# Patient Record
Sex: MALE | Race: WHITE | Employment: OTHER | URBAN - METROPOLITAN AREA
[De-identification: names, ages, dates, MRNs, and addresses within clinical notes are randomized per-mention and may not be internally consistent; named-entity substitution may affect disease eponyms.]

---

## 2017-01-11 ENCOUNTER — TELEPHONE (OUTPATIENT)
Dept: FAMILY MEDICINE CLINIC | Facility: CLINIC | Age: 47
End: 2017-01-11

## 2017-05-12 ENCOUNTER — TELEPHONE (OUTPATIENT)
Dept: FAMILY MEDICINE CLINIC | Facility: CLINIC | Age: 47
End: 2017-05-12

## 2018-02-21 ENCOUNTER — TELEPHONE (OUTPATIENT)
Dept: FAMILY MEDICINE CLINIC | Facility: CLINIC | Age: 48
End: 2018-02-21

## 2018-05-15 ENCOUNTER — TELEPHONE (OUTPATIENT)
Dept: FAMILY MEDICINE CLINIC | Facility: CLINIC | Age: 48
End: 2018-05-15

## 2018-05-15 NOTE — TELEPHONE ENCOUNTER
In 2016 we got :  Received medical records request 07/12/16 from Department of [facility] requesting patient's records. Records were mailed to Department of [facility] 07/15/16.       [facility] but we still need to do a MA sup

## 2018-06-18 ENCOUNTER — TELEPHONE (OUTPATIENT)
Dept: FAMILY MEDICINE CLINIC | Facility: CLINIC | Age: 48
End: 2018-06-18

## 2018-06-18 DIAGNOSIS — Z00.00 LABORATORY EXAMINATION ORDERED AS PART OF A ROUTINE GENERAL MEDICAL EXAMINATION: Primary | ICD-10-CM

## 2018-06-18 NOTE — TELEPHONE ENCOUNTER
Diagnoses and all orders for this visit:    Laboratory examination ordered as part of a routine general medical examination  -     TSH W REFLEX TO FREE T4; Future  -     LIPID PANEL; Future  -     COMP METABOLIC PANEL (14);  Future    screening dx, so I bel

## 2018-06-18 NOTE — TELEPHONE ENCOUNTER
Pt has scheduled his MA Supervisit on 8/15/18. Did you want to order labs?  Pt not wanting them due to cost?

## 2018-08-09 ENCOUNTER — TELEPHONE (OUTPATIENT)
Dept: FAMILY MEDICINE CLINIC | Facility: CLINIC | Age: 48
End: 2018-08-09

## 2018-08-09 NOTE — TELEPHONE ENCOUNTER
Pt has answered his Medicare Assessment form for appt w/Dr Rochelle Toro on 8/15/18 (form in Triage)

## 2018-08-14 PROBLEM — M47.816 LUMBAR FACET ARTHROPATHY: Status: ACTIVE | Noted: 2018-08-14

## 2018-08-14 NOTE — ASSESSMENT & PLAN NOTE
He smoked tobacco in the past but quit greater than 12 months ago.   Smoking status: Former Smoker                                                              Packs/day: 0.50      Years: 30.00        Types: Cigarettes, Cigars     Quit date: 1/1/2000  Smoke

## 2018-08-14 NOTE — ASSESSMENT & PLAN NOTE
Continue to follow, working on [address], but resistance exists. Continue present management.

## 2018-08-15 ENCOUNTER — OFFICE VISIT (OUTPATIENT)
Dept: FAMILY MEDICINE CLINIC | Facility: CLINIC | Age: 48
End: 2018-08-15
Payer: MEDICARE

## 2018-08-15 VITALS
SYSTOLIC BLOOD PRESSURE: 124 MMHG | HEIGHT: 71 IN | HEART RATE: 100 BPM | BODY MASS INDEX: 41.58 KG/M2 | TEMPERATURE: 98 F | RESPIRATION RATE: 16 BRPM | WEIGHT: 297 LBS | DIASTOLIC BLOOD PRESSURE: 80 MMHG

## 2018-08-15 DIAGNOSIS — G89.29 CHRONIC PAIN OF BOTH SHOULDERS: ICD-10-CM

## 2018-08-15 DIAGNOSIS — D18.02 CAVERNOUS HEMANGIOMA OF BRAIN (HCC): ICD-10-CM

## 2018-08-15 DIAGNOSIS — Z00.00 ANNUAL PHYSICAL EXAM: Primary | ICD-10-CM

## 2018-08-15 DIAGNOSIS — M51.37 DDD (DEGENERATIVE DISC DISEASE), LUMBOSACRAL: ICD-10-CM

## 2018-08-15 DIAGNOSIS — E66.01 SEVERE OBESITY (BMI >= 40) (HCC): ICD-10-CM

## 2018-08-15 DIAGNOSIS — G89.29 CHRONIC PAIN OF BOTH KNEES: ICD-10-CM

## 2018-08-15 DIAGNOSIS — M25.561 CHRONIC PAIN OF BOTH KNEES: ICD-10-CM

## 2018-08-15 DIAGNOSIS — IMO0001: ICD-10-CM

## 2018-08-15 DIAGNOSIS — G89.4 CHRONIC PAIN SYNDROME: ICD-10-CM

## 2018-08-15 DIAGNOSIS — M25.512 CHRONIC PAIN OF BOTH SHOULDERS: ICD-10-CM

## 2018-08-15 DIAGNOSIS — I10 ESSENTIAL HYPERTENSION: ICD-10-CM

## 2018-08-15 DIAGNOSIS — M25.511 CHRONIC PAIN OF BOTH SHOULDERS: ICD-10-CM

## 2018-08-15 DIAGNOSIS — F17.211 CIGARETTE NICOTINE DEPENDENCE IN REMISSION: ICD-10-CM

## 2018-08-15 DIAGNOSIS — E66.01 MORBID OBESITY WITH BMI OF 40.0-44.9, ADULT (HCC): ICD-10-CM

## 2018-08-15 DIAGNOSIS — M25.562 CHRONIC PAIN OF BOTH KNEES: ICD-10-CM

## 2018-08-15 DIAGNOSIS — Z00.00 ENCOUNTER FOR ANNUAL HEALTH EXAMINATION: ICD-10-CM

## 2018-08-15 DIAGNOSIS — M47.816 LUMBAR FACET ARTHROPATHY: ICD-10-CM

## 2018-08-15 DIAGNOSIS — G25.81 RLS (RESTLESS LEGS SYNDROME): ICD-10-CM

## 2018-08-15 PROCEDURE — 96160 PT-FOCUSED HLTH RISK ASSMT: CPT | Performed by: FAMILY MEDICINE

## 2018-08-15 PROCEDURE — G0438 PPPS, INITIAL VISIT: HCPCS | Performed by: FAMILY MEDICINE

## 2018-08-15 RX ORDER — AMLODIPINE BESYLATE 5 MG/1
5 TABLET ORAL DAILY
COMMUNITY

## 2018-08-15 RX ORDER — POLYETHYLENE GLYCOL 3350 17 G/17G
17 POWDER, FOR SOLUTION ORAL DAILY
COMMUNITY

## 2018-08-15 RX ORDER — LISINOPRIL 20 MG/1
20 TABLET ORAL DAILY
COMMUNITY

## 2018-08-15 RX ORDER — ALBUTEROL SULFATE 90 UG/1
2 AEROSOL, METERED RESPIRATORY (INHALATION) EVERY 6 HOURS PRN
COMMUNITY

## 2018-08-15 RX ORDER — HYDROCHLOROTHIAZIDE 25 MG/1
25 TABLET ORAL DAILY
COMMUNITY

## 2018-08-15 RX ORDER — ATORVASTATIN CALCIUM 80 MG/1
80 TABLET, FILM COATED ORAL NIGHTLY
COMMUNITY

## 2018-08-15 NOTE — ASSESSMENT & PLAN NOTE
Getting therapy at the [facility], workup in progress, likely due to the crutch use and they are working on it but we can look at physical therapy here in occupational therapy as well if he runs out of benefits for the calendar year at the [facility] system

## 2018-08-15 NOTE — ASSESSMENT & PLAN NOTE
Getting physical therapy at the [facility] but we can offered here if symptoms are not improved and he runs out of benefits for the calendar year

## 2018-08-15 NOTE — PROGRESS NOTES
HPI:   Kelley Bush is a 51year old male who presents for a MA (Medicare Advantage) [facility] (Once per calendar year). Doing OK, following with CECILIA Perales.   His last annual assessment has been over 1 year: Annual Physical due on 05/04 problems based on screening of functional status. Difficulty walking?: Yes   He has problems with Daily Activities based on screening of functional status.    Problems with daily activities? : Yes   He has problems with Memory based on screening of functi both shoulders     Essential hypertension    Wt Readings from Last 3 Encounters:  08/15/18 : 297 lb  05/04/16 : 224 lb  02/04/16 : 227 lb 3.2 oz     Last Cholesterol Labs:   No results found for: CHOLEST, HDL, LDL, TRIG       Last Chemistry Labs:   No resu smoking about 18 years ago. His smoking use included Cigarettes and Cigars. He has a 15.00 pack-year smoking history. He has never used smokeless tobacco. He reports that he does not drink alcohol or use drugs.      REVIEW OF SYSTEMS:   Review of Systems normal. No oropharyngeal exudate. Eyes: Conjunctivae and EOM are normal. Pupils are equal, round, and reactive to light. Neck: Trachea normal and normal range of motion. Neck supple. Normal carotid pulses present. No edema present.  No thyroid mass and Cardiovascular    Essential hypertension    Overview     Lisinopril 20, metoprolol 25, amlodipine 5         Current Assessment & Plan     Stable, Continue present management.     Blood Pressure and Cardiac Medications          lisinopril 20 MG Oral Tab    m back brace         Current Assessment & Plan     Continue to follow, working on [address], but resistance exists. Continue present management.           Chronic pain of both knees    Overview     [facility] managing, on NSAIDS and PT         Curr separate sheet to patient  PREVENTATIVE SERVICES  INDICATIONS AND SCHEDULE Internal Lab or Procedure External Lab or Procedure   Diabetes Screening      HbgA1C   Annually No results found for: A1C    No flowsheet data found.     Fasting Blood Sugar (FSB)Norma by Medicare Part B) No vaccine history found This may be covered with your prescription benefits, but Medicare does not cover unless Medically needed    Zoster   Not covered by Medicare Part B No vaccine history found This may be covered with your pharmacy

## 2018-08-15 NOTE — PATIENT INSTRUCTIONS
Kostas Kim's SCREENING SCHEDULE   Tests on this list are recommended by your physician but may not be covered, or covered at this frequency, by your insurer. Please check with your insurance carrier before scheduling to verify coverage.     Taj Arita Covered every 5 years No results found for this or any previous visit. No flowsheet data found. Fecal Occult Blood   Covered Annually No results found for: FOB, OCCULTSTOOL No flowsheet data found.      Barium Enema-   uncomfortable but covered  Covered this or any previous visit. This may be covered with your pharmacy  prescription benefits     Recommended Websites for Advanced Directives    [email or web address]. org/publications/Documents/personal_[email or web address]. pdf  An information packet, including necessary form from

## 2018-08-15 NOTE — ASSESSMENT & PLAN NOTE
Stable, Continue present management.     Blood Pressure and Cardiac Medications          lisinopril 20 MG Oral Tab    metoprolol Tartrate 25 MG Oral Tab    AmLODIPine Besylate 5 MG Oral Tab

## 2019-02-11 ENCOUNTER — TELEPHONE (OUTPATIENT)
Dept: FAMILY MEDICINE CLINIC | Facility: CLINIC | Age: 49
End: 2019-02-11

## 2019-02-15 ENCOUNTER — MA CHART PREP (OUTPATIENT)
Dept: FAMILY MEDICINE CLINIC | Facility: CLINIC | Age: 49
End: 2019-02-15

## 2019-02-15 ENCOUNTER — OFFICE VISIT (OUTPATIENT)
Dept: FAMILY MEDICINE CLINIC | Facility: CLINIC | Age: 49
End: 2019-02-15
Payer: MEDICARE

## 2019-02-15 VITALS
HEART RATE: 68 BPM | SYSTOLIC BLOOD PRESSURE: 112 MMHG | WEIGHT: 292 LBS | HEIGHT: 71.25 IN | DIASTOLIC BLOOD PRESSURE: 80 MMHG | RESPIRATION RATE: 14 BRPM | BODY MASS INDEX: 40.43 KG/M2 | TEMPERATURE: 99 F

## 2019-02-15 DIAGNOSIS — G89.29 CHRONIC PAIN OF BOTH SHOULDERS: ICD-10-CM

## 2019-02-15 DIAGNOSIS — Z00.00 ANNUAL PHYSICAL EXAM: Primary | ICD-10-CM

## 2019-02-15 DIAGNOSIS — G25.81 RLS (RESTLESS LEGS SYNDROME): ICD-10-CM

## 2019-02-15 DIAGNOSIS — M25.562 CHRONIC PAIN OF BOTH KNEES: ICD-10-CM

## 2019-02-15 DIAGNOSIS — M25.511 CHRONIC PAIN OF BOTH SHOULDERS: ICD-10-CM

## 2019-02-15 DIAGNOSIS — G89.29 CHRONIC PAIN OF BOTH KNEES: ICD-10-CM

## 2019-02-15 DIAGNOSIS — G89.4 CHRONIC PAIN SYNDROME: ICD-10-CM

## 2019-02-15 DIAGNOSIS — I10 ESSENTIAL HYPERTENSION: ICD-10-CM

## 2019-02-15 DIAGNOSIS — M51.37 DDD (DEGENERATIVE DISC DISEASE), LUMBOSACRAL: ICD-10-CM

## 2019-02-15 DIAGNOSIS — F11.20 OPIOID DEPENDENCE IN CONTROLLED ENVIRONMENT (HCC): ICD-10-CM

## 2019-02-15 DIAGNOSIS — Z00.00 ENCOUNTER FOR ANNUAL HEALTH EXAMINATION: ICD-10-CM

## 2019-02-15 DIAGNOSIS — D18.02 CAVERNOUS HEMANGIOMA OF BRAIN (HCC): ICD-10-CM

## 2019-02-15 DIAGNOSIS — M47.816 LUMBAR FACET ARTHROPATHY: ICD-10-CM

## 2019-02-15 DIAGNOSIS — M25.561 CHRONIC PAIN OF BOTH KNEES: ICD-10-CM

## 2019-02-15 DIAGNOSIS — IMO0001: ICD-10-CM

## 2019-02-15 DIAGNOSIS — M25.512 CHRONIC PAIN OF BOTH SHOULDERS: ICD-10-CM

## 2019-02-15 DIAGNOSIS — E66.01 MORBID OBESITY WITH BMI OF 40.0-44.9, ADULT (HCC): ICD-10-CM

## 2019-02-15 DIAGNOSIS — F17.211 CIGARETTE NICOTINE DEPENDENCE IN REMISSION: ICD-10-CM

## 2019-02-15 DIAGNOSIS — R79.89 ELEVATED LFTS: ICD-10-CM

## 2019-02-15 PROCEDURE — 99396 PREV VISIT EST AGE 40-64: CPT | Performed by: FAMILY MEDICINE

## 2019-02-15 PROCEDURE — G0439 PPPS, SUBSEQ VISIT: HCPCS | Performed by: FAMILY MEDICINE

## 2019-02-15 PROCEDURE — 96160 PT-FOCUSED HLTH RISK ASSMT: CPT | Performed by: FAMILY MEDICINE

## 2019-02-15 RX ORDER — CLINDAMYCIN PHOSPHATE 10 MG/ML
1 LOTION TOPICAL 2 TIMES DAILY
Qty: 60 ML | Refills: 2 | Status: SHIPPED | OUTPATIENT
Start: 2019-02-15

## 2019-02-15 NOTE — PATIENT INSTRUCTIONS
Rizwana Kim's SCREENING SCHEDULE   Tests on this list are recommended by your physician but may not be covered, or covered at this frequency, by your insurer. Please check with your insurance carrier before scheduling to verify coverage.     June Neri Covered every 5 years No results found for this or any previous visit. No flowsheet data found. Fecal Occult Blood   Covered Annually No results found for: FOB, OCCULTSTOOL No flowsheet data found.      Barium Enema-   uncomfortable but covered  Covered this or any previous visit. This may be covered with your pharmacy  prescription benefits     Recommended Websites for Advanced Directives    [email or web address]. org/publications/Documents/personal_dec. pdf  An information packet, including necessary form from

## 2019-02-15 NOTE — PROGRESS NOTES
HPI:   Miguel Angel Manzanares is a 51year old male who presents for a MA (Medicare Advantage) [facility] (Once per calendar year).     Doing well, TBI in 2001, stable function  Annual Physical due on 08/15/2019    [facility] just did [facility], listed as \"Clear\" or stab with patient and Family/surrogate (if present), and forms available to patient in AVS       He does NOT have a Power of  for [employer] on file in [employer].    Advance care planning including the explanation and discussion of advance directives standar surgical history (2001) and hernia surgery (Right, 2017). His family history includes Hypertension in his father and mother; Stroke in his father. SOCIAL HISTORY:   He  reports that he quit smoking about 19 years ago.  His smoking use included cigarett are normal. He appears well-developed and well-nourished. HENT:   Head: Normocephalic and atraumatic.    Right Ear: Tympanic membrane, external ear and ear canal normal.   Left Ear: Tympanic membrane, external ear and ear canal normal.   Nose: Nose normal 10/30/2018   • Pneumovax 23 12/16/2015        ASSESSMENT AND OTHER RELEVANT CHRONIC CONDITIONS:   Nadege Nicholson is a 51year old male who presents for a Medicare Assessment.      PLAN SUMMARY:     Diet assessment: good     PLAN:  The patient indicates pain of both knees    Overview     [facility] managing, on NSAIDS and PT         Chronic pain of both shoulders    Overview     [facility] managing, chronic pain, may be due to crutch use.              Other    Chronic brain injury ([facility] [address]    Overview     Cavernous hemangiom patient  PREVENTATIVE SERVICES  INDICATIONS AND SCHEDULE Internal Lab or Procedure External Lab or Procedure   Diabetes Screening      HbgA1C   Annually No results found for: A1C    No flowsheet data found.     Fasting Blood Sugar (FSB)Annually No results f history found This may be covered with your prescription benefits, but Medicare does not cover unless Medically needed    Zoster   Not covered by Medicare Part B No vaccine history found This may be covered with your pharmacy  prescription benefits      SP

## 2019-03-13 ENCOUNTER — TELEPHONE (OUTPATIENT)
Dept: FAMILY MEDICINE CLINIC | Facility: CLINIC | Age: 49
End: 2019-03-13

## 2019-03-13 NOTE — TELEPHONE ENCOUNTER
Completed form and no changes since 2014, form in my triage been for sending the patient. There looks like there is a place for him to sign. OK to notify.  Thanks, Krystal Colunga MD

## 2019-03-13 NOTE — TELEPHONE ENCOUNTER
Pt needs a form filled out for the [employer] stating he okay to drive  Form in Dr. Moya Pair office for review  LOV 2-15-19

## 2019-06-28 ENCOUNTER — TELEPHONE (OUTPATIENT)
Dept: FAMILY MEDICINE CLINIC | Facility: CLINIC | Age: 49
End: 2019-06-28

## 2019-06-28 NOTE — TELEPHONE ENCOUNTER
Pt walked into office asking for a med for pink eye. Pt c/o itchy eyes, no redness, no swelling , no discharge. Explained to Pt that we dont treat without an appt or without symptoms. Pt then states he has been with discharge while here.  While looking at

## 2019-06-28 NOTE — TELEPHONE ENCOUNTER
Patient is here today and he said he believes he has pink eye. He wants to know if Dr. Bhavana Yanes can put in a script for him. His father is sitting in the car.

## 2020-02-26 ENCOUNTER — TELEPHONE (OUTPATIENT)
Dept: FAMILY MEDICINE CLINIC | Facility: CLINIC | Age: 50
End: 2020-02-26

## 2020-07-13 ENCOUNTER — TELEPHONE (OUTPATIENT)
Dept: FAMILY MEDICINE CLINIC | Facility: CLINIC | Age: 50
End: 2020-07-13

## 2020-07-13 DIAGNOSIS — Z12.5 SCREENING FOR PROSTATE CANCER: ICD-10-CM

## 2020-07-13 DIAGNOSIS — M47.816 LUMBAR FACET ARTHROPATHY: ICD-10-CM

## 2020-07-13 DIAGNOSIS — E66.01 MORBID OBESITY WITH BMI OF 40.0-44.9, ADULT (HCC): ICD-10-CM

## 2020-07-13 DIAGNOSIS — Z00.00 LABORATORY EXAMINATION ORDERED AS PART OF A ROUTINE GENERAL MEDICAL EXAMINATION: ICD-10-CM

## 2020-07-13 DIAGNOSIS — I10 ESSENTIAL HYPERTENSION: Primary | ICD-10-CM

## 2020-07-13 NOTE — TELEPHONE ENCOUNTER
Please enter lab orders for the patient's upcoming physical appointment. Physical scheduled:    Your appointments     Date & Time Appointment Department [facility])    Jul 24, 2020  2:00 PM CDT MA Supervisit with Ceci Hewitt MD [address] [place] [address] Jaycob Duarte

## 2020-07-20 ENCOUNTER — TELEPHONE (OUTPATIENT)
Dept: FAMILY MEDICINE CLINIC | Facility: CLINIC | Age: 50
End: 2020-07-20

## 2020-07-24 ENCOUNTER — OFFICE VISIT (OUTPATIENT)
Dept: FAMILY MEDICINE CLINIC | Facility: CLINIC | Age: 50
End: 2020-07-24
Payer: MEDICARE

## 2020-07-24 VITALS
TEMPERATURE: 98 F | HEART RATE: 88 BPM | BODY MASS INDEX: 43.54 KG/M2 | WEIGHT: 311 LBS | SYSTOLIC BLOOD PRESSURE: 134 MMHG | HEIGHT: 71 IN | DIASTOLIC BLOOD PRESSURE: 84 MMHG | RESPIRATION RATE: 20 BRPM

## 2020-07-24 DIAGNOSIS — G89.29 CHRONIC PAIN OF BOTH SHOULDERS: ICD-10-CM

## 2020-07-24 DIAGNOSIS — G25.81 RLS (RESTLESS LEGS SYNDROME): ICD-10-CM

## 2020-07-24 DIAGNOSIS — M47.816 LUMBAR FACET ARTHROPATHY: ICD-10-CM

## 2020-07-24 DIAGNOSIS — Z00.00 ENCOUNTER FOR ANNUAL HEALTH EXAMINATION: ICD-10-CM

## 2020-07-24 DIAGNOSIS — I10 ESSENTIAL HYPERTENSION: ICD-10-CM

## 2020-07-24 DIAGNOSIS — Z00.00 ANNUAL PHYSICAL EXAM: Primary | ICD-10-CM

## 2020-07-24 DIAGNOSIS — S06.9X9S CHRONIC BRAIN INJURY (HCC): ICD-10-CM

## 2020-07-24 DIAGNOSIS — M25.562 CHRONIC PAIN OF BOTH KNEES: ICD-10-CM

## 2020-07-24 DIAGNOSIS — M25.561 CHRONIC PAIN OF BOTH KNEES: ICD-10-CM

## 2020-07-24 DIAGNOSIS — F17.211 CIGARETTE NICOTINE DEPENDENCE IN REMISSION: ICD-10-CM

## 2020-07-24 DIAGNOSIS — G89.4 CHRONIC PAIN SYNDROME: ICD-10-CM

## 2020-07-24 DIAGNOSIS — M25.511 CHRONIC PAIN OF BOTH SHOULDERS: ICD-10-CM

## 2020-07-24 DIAGNOSIS — E66.01 MORBID OBESITY WITH BMI OF 40.0-44.9, ADULT (HCC): ICD-10-CM

## 2020-07-24 DIAGNOSIS — D18.02 CAVERNOUS HEMANGIOMA OF BRAIN (HCC): ICD-10-CM

## 2020-07-24 DIAGNOSIS — G89.29 CHRONIC PAIN OF BOTH KNEES: ICD-10-CM

## 2020-07-24 DIAGNOSIS — F11.20 OPIOID DEPENDENCE IN CONTROLLED ENVIRONMENT (HCC): ICD-10-CM

## 2020-07-24 DIAGNOSIS — R79.89 ELEVATED LFTS: ICD-10-CM

## 2020-07-24 DIAGNOSIS — M51.37 DDD (DEGENERATIVE DISC DISEASE), LUMBOSACRAL: ICD-10-CM

## 2020-07-24 DIAGNOSIS — M25.512 CHRONIC PAIN OF BOTH SHOULDERS: ICD-10-CM

## 2020-07-24 PROCEDURE — 99396 PREV VISIT EST AGE 40-64: CPT | Performed by: FAMILY MEDICINE

## 2020-07-24 PROCEDURE — 96160 PT-FOCUSED HLTH RISK ASSMT: CPT | Performed by: FAMILY MEDICINE

## 2020-07-24 PROCEDURE — G0439 PPPS, SUBSEQ VISIT: HCPCS | Performed by: FAMILY MEDICINE

## 2020-07-24 PROCEDURE — 3075F SYST BP GE 130 - 139MM HG: CPT | Performed by: FAMILY MEDICINE

## 2020-07-24 PROCEDURE — 3079F DIAST BP 80-89 MM HG: CPT | Performed by: FAMILY MEDICINE

## 2020-07-24 PROCEDURE — 3008F BODY MASS INDEX DOCD: CPT | Performed by: FAMILY MEDICINE

## 2020-07-24 NOTE — PROGRESS NOTES
HPI:   Grant Kelsey is a 45year old male who presents for a MA (Medicare Advantage) [address] [facility] (Once per calendar year). Recent blood in stool, but lots of BM movementes, stopped after 1 day. His last annual assessment has been over 1 year:  [place], and patient is instructed to get our office a copy of it for scanning into Epic. He does have a POA but we do NOT have it on file in [address] [place] [address] Rd.    The patient has this document but we do not have it in [facility], and patient is instructed to get our off mouth nightly. Albuterol Sulfate  (90 Base) MCG/ACT Inhalation Aero Soln, Inhale 2 puffs into the lungs every 6 (six) hours as needed for Wheezing. lisinopril 20 MG Oral Tab, Take 20 mg by mouth daily.   hydrochlorothiazide 25 MG Oral Tab, Take 25 Allergic/Immunologic: Negative for immunocompromised state. Neurological: Negative for weakness and numbness. Hematological: Negative for adenopathy.         EXAM:   /84 (BP Location: Left arm)   Pulse 88   Temp 98.3 °F (36.8 °C) (Temporal)   Re Effort normal and breath sounds normal. No respiratory distress. He has no decreased breath sounds. He has no wheezes. He has no rales. He exhibits no tenderness. Abdominal: Soft. Bowel sounds are normal. He exhibits no distension.  There is no hepatosple RLS (restless legs syndrome)    Overview     Ropinirole (requip) 1mg         Current Assessment & Plan     Stable continue present management             Mental Health    Cigarette nicotine dependence in remission    Overview     Cigarette 1/2 PPD until la follow up if no change         Morbid obesity with BMI of 40.0-44.9, adult (HCC)    Overview     Body mass index is 41.42 kg/m². Current Assessment & Plan     Working on weight loss.  Continue present management            Other Visit Diagnoses AA>50, > 65 No flowsheet data found.     Prostate Cancer Screening      PSA  Annually PSA due on 04/25/2019  Update Health Maintenance if applicable     Immunizations (Update Immunization Activity if applicable)     Influenza  Covered Annually No va

## 2020-07-24 NOTE — PATIENT INSTRUCTIONS
Jimbo Kim's SCREENING SCHEDULE   Tests on this list are recommended by your physician but may not be covered, or covered at this frequency, by your insurer. Please check with your insurance carrier before scheduling to verify coverage.     Efrain Wilkinson for this or any previous visit. No flowsheet data found. Fecal Occult Blood   Covered Annually No results found for: FOB, OCCULTSTOOL No flowsheet data found.      Barium Enema-   uncomfortable but covered  Covered but uncomfortable   Glaucoma Screening benefits     Recommended Websites for Advanced Directives    [email or web address].no. org/publications/Documents/personal_dec. pdf  An information packet, including necessary form from the [address] [employer] website. http://www. [place].state. [place]/publi

## 2021-01-18 ENCOUNTER — TELEPHONE (OUTPATIENT)
Dept: FAMILY MEDICINE CLINIC | Facility: CLINIC | Age: 51
End: 2021-01-18

## 2021-01-22 PROBLEM — M47.816 ARTHRITIS OF FACET JOINT OF LUMBAR SPINE: Status: ACTIVE | Noted: 2018-08-14

## 2021-01-25 ENCOUNTER — OFFICE VISIT (OUTPATIENT)
Dept: FAMILY MEDICINE CLINIC | Facility: CLINIC | Age: 51
End: 2021-01-25
Payer: MEDICARE

## 2021-01-25 VITALS
HEIGHT: 71 IN | DIASTOLIC BLOOD PRESSURE: 82 MMHG | BODY MASS INDEX: 43.56 KG/M2 | HEART RATE: 82 BPM | RESPIRATION RATE: 18 BRPM | WEIGHT: 311.19 LBS | TEMPERATURE: 97 F | SYSTOLIC BLOOD PRESSURE: 138 MMHG

## 2021-01-25 DIAGNOSIS — R79.89 ELEVATED LFTS: ICD-10-CM

## 2021-01-25 DIAGNOSIS — M25.511 CHRONIC PAIN OF BOTH SHOULDERS: ICD-10-CM

## 2021-01-25 DIAGNOSIS — S06.9X9S CHRONIC BRAIN INJURY (HCC): ICD-10-CM

## 2021-01-25 DIAGNOSIS — M47.816 ARTHRITIS OF FACET JOINT OF LUMBAR SPINE: ICD-10-CM

## 2021-01-25 DIAGNOSIS — M25.512 CHRONIC PAIN OF BOTH SHOULDERS: ICD-10-CM

## 2021-01-25 DIAGNOSIS — M25.561 CHRONIC PAIN OF BOTH KNEES: ICD-10-CM

## 2021-01-25 DIAGNOSIS — D18.02 CAVERNOUS HEMANGIOMA OF BRAIN (HCC): ICD-10-CM

## 2021-01-25 DIAGNOSIS — F17.211 CIGARETTE NICOTINE DEPENDENCE IN REMISSION: ICD-10-CM

## 2021-01-25 DIAGNOSIS — M51.37 DDD (DEGENERATIVE DISC DISEASE), LUMBOSACRAL: ICD-10-CM

## 2021-01-25 DIAGNOSIS — F11.20 OPIOID DEPENDENCE IN CONTROLLED ENVIRONMENT (HCC): ICD-10-CM

## 2021-01-25 DIAGNOSIS — G89.29 CHRONIC PAIN OF BOTH SHOULDERS: ICD-10-CM

## 2021-01-25 DIAGNOSIS — E66.01 MORBID OBESITY WITH BMI OF 40.0-44.9, ADULT (HCC): ICD-10-CM

## 2021-01-25 DIAGNOSIS — Z00.00 ENCOUNTER FOR ANNUAL HEALTH EXAMINATION: ICD-10-CM

## 2021-01-25 DIAGNOSIS — M25.562 CHRONIC PAIN OF BOTH KNEES: ICD-10-CM

## 2021-01-25 DIAGNOSIS — G25.81 RLS (RESTLESS LEGS SYNDROME): ICD-10-CM

## 2021-01-25 DIAGNOSIS — G89.29 CHRONIC PAIN OF BOTH KNEES: ICD-10-CM

## 2021-01-25 DIAGNOSIS — Z00.00 ANNUAL PHYSICAL EXAM: Primary | ICD-10-CM

## 2021-01-25 DIAGNOSIS — G89.4 CHRONIC PAIN SYNDROME: ICD-10-CM

## 2021-01-25 DIAGNOSIS — I10 ESSENTIAL HYPERTENSION: ICD-10-CM

## 2021-01-25 PROCEDURE — 3075F SYST BP GE 130 - 139MM HG: CPT | Performed by: FAMILY MEDICINE

## 2021-01-25 PROCEDURE — 3079F DIAST BP 80-89 MM HG: CPT | Performed by: FAMILY MEDICINE

## 2021-01-25 PROCEDURE — G0439 PPPS, SUBSEQ VISIT: HCPCS | Performed by: FAMILY MEDICINE

## 2021-01-25 PROCEDURE — 3008F BODY MASS INDEX DOCD: CPT | Performed by: FAMILY MEDICINE

## 2021-01-25 PROCEDURE — 96160 PT-FOCUSED HLTH RISK ASSMT: CPT | Performed by: FAMILY MEDICINE

## 2021-01-25 PROCEDURE — 99396 PREV VISIT EST AGE 40-64: CPT | Performed by: FAMILY MEDICINE

## 2021-01-25 NOTE — PATIENT INSTRUCTIONS
America Kim's SCREENING SCHEDULE   Tests on this list are recommended by your physician but may not be covered, or covered at this frequency, by your insurer. Please check with your insurance carrier before scheduling to verify coverage.     Claude Burkett for this or any previous visit. No flowsheet data found. Fecal Occult Blood   Covered Annually No results found for: FOB, OCCULTSTOOL No flowsheet data found.      Barium Enema-   uncomfortable but covered  Covered but uncomfortable   Glaucoma Screening benefits     Recommended Websites for Advanced Directives    SeekAlumni.no. org/publications/Documents/personal_dec. pdf  An information packet, including necessary form from the [address] website. http://www. [place]. [place]/publi

## 2021-01-25 NOTE — PROGRESS NOTES
HPI:   Marylou Galeano is a 45year old male who presents for a MA (Medicare Advantage) [address] [facility] (Once per calendar year). Overall doing well but chronic disability through [facility] and through [place] due to chronic head injury.   He has been  has Walking problems based on screening of functional status. Difficulty walking?: Yes   He has problems with Memory based on screening of functional status.    Memory Problems?: Yes   using crutches bilaterally    Depression Screening (PHQ-2/PHQ-9): Over shoulders     Essential hypertension     Opioid dependence in controlled environment ([facility] [address]     Elevated LFTs    Wt Readings from Last 3 Encounters:  01/25/21 : (!) 311 lb 3.2 oz (141.2 kg)  07/24/20 : (!) 311 lb (141.1 kg)  02/15/19 : 292 lb (132.5 kg) history (2001) and hernia surgery (Right, 2017). His family history includes Hypertension in his father and mother; Stroke in his father. SOCIAL HISTORY:   He  reports that he quit smoking about 21 years ago.  His smoking use included cigarettes and ci Cardiovascular: Normal rate, regular rhythm, S1 normal, S2 normal, normal heart sounds and intact distal pulses. Exam reveals no gallop, no S3, no S4 and no friction rub. No murmur heard. Edema not present. Carotid bruit not present.   Pulmonary/Ches hypertension    Overview     Lisinopril 20, metoprolol 25, amlodipine 5         Current Assessment & Plan     Stable, Continue present management.     Blood Pressure and Cardiac Medications          lisinopril 20 MG Oral Tab    metoprolol Tartrate 25 MG Ora Dx 2001, difficulty learning since that point, walks with cane. On disability long term b.o this         Current Assessment & Plan     Stable per [facility].  Continue present management             Other    Chronic brain injury [facility])    Overview     Cavernous GLUCOSE, GLU       Cardiovascular Disease Screening     LDL Annually No results found for: LDL, LDLC     EKG - w/ Initial Preventative Physical Exam only, or if medically necessary Electrocardiogram date    Colorectal Cancer Screening      Colonoscopy Scre Monitoring of Persistent     Medications (ACE/ARB, digoxin diuretics, anticonvulsants.)    Potassium  Annually No results found for: K, POTASSIUM No flowsheet data found. Creatinine  Annually No results found for: CREATSERUM No flowsheet data found.

## 2021-08-04 ENCOUNTER — OFFICE VISIT (OUTPATIENT)
Dept: FAMILY MEDICINE CLINIC | Facility: CLINIC | Age: 51
End: 2021-08-04
Payer: MEDICARE

## 2021-08-04 VITALS
DIASTOLIC BLOOD PRESSURE: 80 MMHG | WEIGHT: 315 LBS | RESPIRATION RATE: 16 BRPM | BODY MASS INDEX: 44.1 KG/M2 | HEIGHT: 71 IN | HEART RATE: 88 BPM | SYSTOLIC BLOOD PRESSURE: 118 MMHG

## 2021-08-04 DIAGNOSIS — M25.562 CHRONIC PAIN OF BOTH KNEES: ICD-10-CM

## 2021-08-04 DIAGNOSIS — G89.29 CHRONIC PAIN OF BOTH KNEES: ICD-10-CM

## 2021-08-04 DIAGNOSIS — E66.01 MORBID OBESITY WITH BMI OF 40.0-44.9, ADULT (HCC): ICD-10-CM

## 2021-08-04 DIAGNOSIS — M75.51 ACUTE BURSITIS OF RIGHT SHOULDER: ICD-10-CM

## 2021-08-04 DIAGNOSIS — I10 ESSENTIAL HYPERTENSION: Primary | ICD-10-CM

## 2021-08-04 DIAGNOSIS — F33.0 MILD RECURRENT MAJOR DEPRESSION (HCC): Chronic | ICD-10-CM

## 2021-08-04 DIAGNOSIS — F11.20 OPIOID DEPENDENCE IN CONTROLLED ENVIRONMENT (HCC): ICD-10-CM

## 2021-08-04 DIAGNOSIS — M25.561 CHRONIC PAIN OF BOTH KNEES: ICD-10-CM

## 2021-08-04 PROCEDURE — 3074F SYST BP LT 130 MM HG: CPT | Performed by: FAMILY MEDICINE

## 2021-08-04 PROCEDURE — 3079F DIAST BP 80-89 MM HG: CPT | Performed by: FAMILY MEDICINE

## 2021-08-04 PROCEDURE — 3008F BODY MASS INDEX DOCD: CPT | Performed by: FAMILY MEDICINE

## 2021-08-04 PROCEDURE — 99214 OFFICE O/P EST MOD 30 MIN: CPT | Performed by: FAMILY MEDICINE

## 2021-08-04 RX ORDER — BUSPIRONE HYDROCHLORIDE 10 MG/1
TABLET ORAL
COMMUNITY
Start: 2021-07-20

## 2021-08-04 RX ORDER — KETOCONAZOLE 20 MG/G
CREAM TOPICAL
COMMUNITY
Start: 2021-07-20

## 2021-08-04 RX ORDER — TIZANIDINE 4 MG/1
TABLET ORAL
COMMUNITY
Start: 2021-07-21

## 2021-08-04 RX ORDER — SULFACETAMIDE SODIUM 100 MG/ML
LOTION TOPICAL
COMMUNITY
Start: 2021-07-20

## 2021-08-04 RX ORDER — BENZOYL PEROXIDE 50 MG/ML
LOTION TOPICAL
COMMUNITY
Start: 2021-05-10

## 2021-08-04 RX ORDER — SERTRALINE HYDROCHLORIDE 100 MG/1
TABLET, FILM COATED ORAL
COMMUNITY
Start: 2021-07-20

## 2021-08-04 RX ORDER — MAG HYDROX/ALUMINUM HYD/SIMETH 400-400-40
SUSPENSION, ORAL (FINAL DOSE FORM) ORAL
COMMUNITY
Start: 2021-07-20

## 2021-08-04 RX ORDER — KETOCONAZOLE 20 MG/ML
SHAMPOO TOPICAL
COMMUNITY
Start: 2021-07-20

## 2021-08-04 RX ORDER — OMEPRAZOLE 20 MG/1
CAPSULE, DELAYED RELEASE ORAL
COMMUNITY
Start: 2021-07-20

## 2021-08-04 NOTE — PROGRESS NOTES
HPI/Subjective:     Patient presents with:  Blood Pressure: 6 month follow up      [address] is a 45year old male who presents for recheck of his hypertension.  He has been taking medications as instructed, with no medication side ef on weight loss. Continue present management   5. Acute bursitis of right shoulder  6. Mild recurrent major depression (HCC)  Overview:  PHQ-9 score of 5 in 1/2021, no meds  Assessment & Plan:  Stable off meds. Significant pain.   Home exercise program for

## 2021-08-12 RX ORDER — FUROSEMIDE 40 MG/1
TABLET ORAL
COMMUNITY
Start: 2021-07-20

## 2021-08-12 RX ORDER — DIVALPROEX SODIUM 500 MG/1
TABLET, EXTENDED RELEASE ORAL
COMMUNITY
Start: 2021-07-20

## 2021-08-13 ENCOUNTER — OFFICE VISIT (OUTPATIENT)
Dept: FAMILY MEDICINE CLINIC | Facility: CLINIC | Age: 51
End: 2021-08-13
Payer: MEDICARE

## 2021-08-13 VITALS
HEART RATE: 86 BPM | DIASTOLIC BLOOD PRESSURE: 80 MMHG | BODY MASS INDEX: 44.1 KG/M2 | RESPIRATION RATE: 14 BRPM | SYSTOLIC BLOOD PRESSURE: 126 MMHG | WEIGHT: 315 LBS | HEIGHT: 71 IN

## 2021-08-13 DIAGNOSIS — M75.51 CHRONIC BURSITIS OF RIGHT SHOULDER: ICD-10-CM

## 2021-08-13 DIAGNOSIS — M75.80 ROTATOR CUFF TENDONITIS, UNSPECIFIED LATERALITY: Primary | ICD-10-CM

## 2021-08-13 DIAGNOSIS — G89.4 CHRONIC PAIN SYNDROME: ICD-10-CM

## 2021-08-13 PROCEDURE — 3079F DIAST BP 80-89 MM HG: CPT | Performed by: FAMILY MEDICINE

## 2021-08-13 PROCEDURE — 3074F SYST BP LT 130 MM HG: CPT | Performed by: FAMILY MEDICINE

## 2021-08-13 PROCEDURE — 3008F BODY MASS INDEX DOCD: CPT | Performed by: FAMILY MEDICINE

## 2021-08-13 PROCEDURE — 20610 DRAIN/INJ JOINT/BURSA W/O US: CPT | Performed by: FAMILY MEDICINE

## 2021-08-13 RX ORDER — TRIAMCINOLONE ACETONIDE 40 MG/ML
40 INJECTION, SUSPENSION INTRA-ARTICULAR; INTRAMUSCULAR ONCE
Status: COMPLETED | OUTPATIENT
Start: 2021-08-13 | End: 2021-08-13

## 2021-08-13 RX ADMIN — TRIAMCINOLONE ACETONIDE 40 MG: 40 INJECTION, SUSPENSION INTRA-ARTICULAR; INTRAMUSCULAR at 01:15:00

## 2021-08-13 NOTE — PROGRESS NOTES
Vieira Solders is a 45year old male coming in for had concerns including Shots (cortisone shot on right shoulder ). HPI/Subjective:   HPI continued shoulder pain, evaluation from last month for cortisone injection.   Is good good strength with pain with ra injection  Follow-up in the office PRN. Assessment & Plan:   1. Rotator cuff tendonitis, unspecified laterality (Primary)  -     Triamcinolone Acetonide  2. Chronic pain syndrome  Overview:  Due to DDD, on [place] [address], #120/month, [facility] managing  3.

## 2022-04-26 NOTE — ASSESSMENT & PLAN NOTE
Stable, on disability, Stable, Continue present management. Rituxan Counseling:  I discussed with the patient the risks of Rituxan infusions. Side effects can include infusion reactions, severe drug rashes including mucocutaneous reactions, reactivation of latent hepatitis and other infections and rarely progressive multifocal leukoencephalopathy.  All of the patient's questions and concerns were addressed.

## 2023-04-03 NOTE — ASSESSMENT & PLAN NOTE
Patient viewed results via [employer]. The following message has been sent to patient via [employer].     \"Mini,  I see that you viewed your results via [employer]. I have placed you on the recall list, we will reach out when it's time to repeat your procedures. If you have any GI related questions or concerns you can give us a call at [phone]. \"    Recall and HM have been updated.   Stable continue present management

## 2023-09-15 PROBLEM — F11.20 OPIOID DEPENDENCE WITH CURRENT USE (HCC): Status: ACTIVE | Noted: 2019-02-15

## 2023-09-18 ENCOUNTER — OFFICE VISIT (OUTPATIENT)
Dept: FAMILY MEDICINE CLINIC | Facility: CLINIC | Age: 53
End: 2023-09-18
Payer: MEDICARE

## 2023-09-18 VITALS
HEIGHT: 71 IN | WEIGHT: 315 LBS | HEART RATE: 82 BPM | BODY MASS INDEX: 44.1 KG/M2 | DIASTOLIC BLOOD PRESSURE: 80 MMHG | RESPIRATION RATE: 16 BRPM | SYSTOLIC BLOOD PRESSURE: 130 MMHG

## 2023-09-18 DIAGNOSIS — D18.02 CAVERNOUS HEMANGIOMA OF BRAIN (HCC): ICD-10-CM

## 2023-09-18 DIAGNOSIS — F17.211 CIGARETTE NICOTINE DEPENDENCE IN REMISSION: ICD-10-CM

## 2023-09-18 DIAGNOSIS — M25.512 CHRONIC PAIN OF BOTH SHOULDERS: ICD-10-CM

## 2023-09-18 DIAGNOSIS — Z00.00 ENCOUNTER FOR ANNUAL HEALTH EXAMINATION: ICD-10-CM

## 2023-09-18 DIAGNOSIS — G25.81 RLS (RESTLESS LEGS SYNDROME): ICD-10-CM

## 2023-09-18 DIAGNOSIS — G89.4 CHRONIC PAIN SYNDROME: ICD-10-CM

## 2023-09-18 DIAGNOSIS — G89.29 CHRONIC PAIN OF BOTH KNEES: ICD-10-CM

## 2023-09-18 DIAGNOSIS — M51.37 DDD (DEGENERATIVE DISC DISEASE), LUMBOSACRAL: ICD-10-CM

## 2023-09-18 DIAGNOSIS — M25.562 CHRONIC PAIN OF BOTH KNEES: ICD-10-CM

## 2023-09-18 DIAGNOSIS — I10 ESSENTIAL HYPERTENSION: ICD-10-CM

## 2023-09-18 DIAGNOSIS — M25.511 CHRONIC PAIN OF BOTH SHOULDERS: ICD-10-CM

## 2023-09-18 DIAGNOSIS — F11.20 OPIOID DEPENDENCE WITH CURRENT USE (HCC): ICD-10-CM

## 2023-09-18 DIAGNOSIS — F33.0 MILD RECURRENT MAJOR DEPRESSION (HCC): Chronic | ICD-10-CM

## 2023-09-18 DIAGNOSIS — S06.9XAS CHRONIC BRAIN INJURY (HCC): ICD-10-CM

## 2023-09-18 DIAGNOSIS — E66.01 MORBID OBESITY WITH BMI OF 40.0-44.9, ADULT (HCC): ICD-10-CM

## 2023-09-18 DIAGNOSIS — R79.89 ELEVATED LFTS: ICD-10-CM

## 2023-09-18 DIAGNOSIS — M46.96 UNSPECIFIED INFLAMMATORY SPONDYLOPATHY, LUMBAR REGION (HCC): ICD-10-CM

## 2023-09-18 DIAGNOSIS — M25.561 CHRONIC PAIN OF BOTH KNEES: ICD-10-CM

## 2023-09-18 DIAGNOSIS — G89.29 CHRONIC PAIN OF BOTH SHOULDERS: ICD-10-CM

## 2023-09-18 DIAGNOSIS — Z00.00 ANNUAL PHYSICAL EXAM: Primary | ICD-10-CM

## 2023-09-18 DIAGNOSIS — M47.816 ARTHRITIS OF FACET JOINT OF LUMBAR SPINE: ICD-10-CM

## 2023-09-18 RX ORDER — AMOXICILLIN AND CLAVULANATE POTASSIUM 875; 125 MG/1; MG/1
TABLET, FILM COATED ORAL
COMMUNITY
Start: 2023-08-21

## 2023-10-02 ENCOUNTER — TELEPHONE (OUTPATIENT)
Dept: FAMILY MEDICINE CLINIC | Facility: CLINIC | Age: 53
End: 2023-10-02

## 2023-10-02 DIAGNOSIS — E66.01 MORBID OBESITY WITH BMI OF 40.0-44.9, ADULT (HCC): ICD-10-CM

## 2023-10-02 NOTE — TELEPHONE ENCOUNTER
Patient is calling and he is calling in his weight for 3 consecutive weeks as Dr. Walter Meraz requested    9/16/23 346  9/25/23 338  10/2/23 332    He has one shot left and wants to know what to do from here. Please call to advise.

## 2023-10-03 NOTE — TELEPHONE ENCOUNTER
I believe that he is on 0.5 mg. Please verify. Please ask about possible side effects. He will likely need to go up on dosage. KMDFRH dosing:  SUBQ: Initiate and adjust dose using the following schedule: In patients who do not tolerate a dosage increase, consider delaying the increase for an additional 4 weeks:    Week 1 through week 4 : 0.25 mg once weekly. Week 5 through week 8: 0.5 mg once weekly. Week 9 through week 12: 1 mg once weekly. Week 13 through week 16: 1.7 mg once weekly. Week 17 and thereafter (maintenance dosage): 2.4 mg once weekly (preferred regimen); if not tolerated, may use an alternative maintenance dosage of 1.7 mg once weekly.

## 2023-10-04 NOTE — TELEPHONE ENCOUNTER
Called and talked to patient went over dosing schedule he has not started this yet wants script sent to [address] [facility] to see if [facility] will cover this.

## 2023-10-12 ENCOUNTER — TELEPHONE (OUTPATIENT)
Dept: FAMILY MEDICINE CLINIC | Facility: CLINIC | Age: 53
End: 2023-10-12

## 2023-10-12 NOTE — TELEPHONE ENCOUNTER
Received medical records request from [address] [place] [address] requesting patient's medical records from 09/01/2022 to present. All records located in [address] [place] [address] in which request was sent to [employer].  [facility]. [id number] ext [id number]  Case ID # [id number]

## 2023-10-14 ENCOUNTER — TELEPHONE (OUTPATIENT)
Dept: FAMILY MEDICINE CLINIC | Facility: CLINIC | Age: 53
End: 2023-10-14

## 2023-10-14 DIAGNOSIS — E66.01 MORBID OBESITY WITH BMI OF 40.0-44.9, ADULT (HCC): ICD-10-CM

## 2024-02-02 ENCOUNTER — TELEPHONE (OUTPATIENT)
Dept: FAMILY MEDICINE CLINIC | Facility: CLINIC | Age: 54
End: 2024-02-02

## 2024-02-02 DIAGNOSIS — E66.01 MORBID OBESITY WITH BMI OF 40.0-44.9, ADULT (HCC): Primary | ICD-10-CM

## 2024-02-02 NOTE — TELEPHONE ENCOUNTER
Please notify:    Requested Prescriptions     Signed Prescriptions Disp Refills    semaglutide-weight management 0.25 MG/0.5ML Subcutaneous Solution Auto-injector 6 mL 3     Sig: Inject 0.5 mL (0.25 mg total) into the skin once a week.     Authorizing Provider: AMANDA SALEH        Sent to:      [facility] [address] [place], [place] - [address] [address] [phone], [phone] [address]  [facility] [address]  Phone: [phone] Fax: [phone]    [facility] - [place], [place] - [address] [place] [address] [phone], [phone]  [address] [place] [address]  Phone: [phone] Fax: [phone]    [facility]pharmacy #[id number] - [place], [place] - [address], [phone], [phone]  [address] [facility] [address]  Phone: [phone] Fax: [phone]

## 2024-05-17 ENCOUNTER — TELEPHONE (OUTPATIENT)
Dept: FAMILY MEDICINE CLINIC | Facility: CLINIC | Age: 54
End: 2024-05-17

## 2024-05-17 DIAGNOSIS — E66.01 MORBID OBESITY WITH BMI OF 40.0-44.9, ADULT (HCC): Primary | ICD-10-CM

## 2024-05-17 DIAGNOSIS — Z51.81 THERAPEUTIC DRUG MONITORING: ICD-10-CM

## 2024-05-17 DIAGNOSIS — R79.89 ELEVATED LFTS: ICD-10-CM

## 2024-05-17 DIAGNOSIS — Z79.899 LONG-TERM USE OF HIGH-RISK MEDICATION: ICD-10-CM

## 2024-05-17 DIAGNOSIS — I10 ESSENTIAL HYPERTENSION: ICD-10-CM

## 2024-05-17 DIAGNOSIS — Z12.5 SCREENING FOR PROSTATE CANCER: ICD-10-CM

## 2024-05-17 NOTE — TELEPHONE ENCOUNTER
1. Morbid obesity with BMI of 40.0-44.9, adult (HCC) (Primary)  Overview:  Body mass index is 41.42 kg/m².   Orders:  -     TSH W Reflex To Free T4; Future; Expected date: 05/17/2024  -     Lipid Panel; Future; Expected date: 05/17/2024  -     CBC With Differential With Platelet; Future; Expected date: 05/17/2024  -     Comp Metabolic Panel (14); Future; Expected date: 05/17/2024  2. Elevated LFTs  Overview:  [facility]x at [facility], better with TLC  Orders:  -     Comp Metabolic Panel (14); Future; Expected date: 05/17/2024  3. Long-term use of high-risk medication  -     Valproic Acid, (Depakene); Future; Expected date: 05/17/2024  4. Therapeutic drug monitoring  -     Valproic Acid, (Depakene); Future; Expected date: 05/17/2024  5. Screening for prostate cancer  -     PSA Total, Screen; Future; Expected date: 05/17/2024  6. Essential hypertension  Overview:  Lisinopril 20, metoprolol 25, amlodipine 5  Orders:  -     TSH W Reflex To Free T4; Future; Expected date: 05/17/2024  -     Lipid Panel; Future; Expected date: 05/17/2024  -     CBC With Differential With Platelet; Future; Expected date: 05/17/2024  -     Comp Metabolic Panel (14); Future; Expected date: 05/17/2024       OK to notify. Thanks, Demario Andrew MD

## 2024-05-17 NOTE — TELEPHONE ENCOUNTER
Please enter lab orders for the patient's upcoming physical appointment.     Physical scheduled:   Your appointments       Date & Time Appointment Department ([place])    Jun 13, 2024 9:00 AM CDT MA Supervisit with Arnulfo Andrew MD [facility] ([facility])              [facility]  [address]  [facility] [address]  [phone]           Preferred lab: [facility] ([facility])     The patient has been notified to complete fasting labs prior to their physical appointment.

## 2024-06-03 ENCOUNTER — TELEPHONE (OUTPATIENT)
Dept: FAMILY MEDICINE CLINIC | Facility: CLINIC | Age: 54
End: 2024-06-03

## 2024-06-03 NOTE — TELEPHONE ENCOUNTER
Received PA from [employer]ala stating patient needs a PA for the Wegovy 0.25 mg/0.5 ML Pen    Paperwork in Pat,RN's in box for review.     Pt is aware because he is  he said he knows that there are special ways of filing he appreciates our assistance in keeping him informed.

## 2024-06-11 RX ORDER — OMEGA-3-ACID ETHYL ESTERS 1 G/1
CAPSULE, LIQUID FILLED ORAL
COMMUNITY
Start: 2024-05-30

## 2024-06-11 RX ORDER — NAPROXEN 500 MG/1
TABLET ORAL
COMMUNITY
Start: 2024-05-30

## 2024-06-13 ENCOUNTER — OFFICE VISIT (OUTPATIENT)
Dept: FAMILY MEDICINE CLINIC | Facility: CLINIC | Age: 54
End: 2024-06-13
Payer: MEDICARE

## 2024-06-13 VITALS
BODY MASS INDEX: 44.1 KG/M2 | HEART RATE: 84 BPM | SYSTOLIC BLOOD PRESSURE: 130 MMHG | HEIGHT: 71 IN | RESPIRATION RATE: 16 BRPM | DIASTOLIC BLOOD PRESSURE: 86 MMHG | WEIGHT: 315 LBS

## 2024-06-13 DIAGNOSIS — F11.20 OPIOID DEPENDENCE WITH CURRENT USE (HCC): ICD-10-CM

## 2024-06-13 DIAGNOSIS — S06.9XAS CHRONIC BRAIN INJURY (HCC): ICD-10-CM

## 2024-06-13 DIAGNOSIS — I10 ESSENTIAL HYPERTENSION: ICD-10-CM

## 2024-06-13 DIAGNOSIS — M47.816 ARTHRITIS OF FACET JOINT OF LUMBAR SPINE: ICD-10-CM

## 2024-06-13 DIAGNOSIS — R79.89 ELEVATED LFTS: ICD-10-CM

## 2024-06-13 DIAGNOSIS — M51.37 DDD (DEGENERATIVE DISC DISEASE), LUMBOSACRAL: ICD-10-CM

## 2024-06-13 DIAGNOSIS — Z00.00 ENCOUNTER FOR ANNUAL HEALTH EXAMINATION: ICD-10-CM

## 2024-06-13 DIAGNOSIS — G89.29 CHRONIC PAIN OF BOTH SHOULDERS: ICD-10-CM

## 2024-06-13 DIAGNOSIS — M25.561 CHRONIC PAIN OF BOTH KNEES: ICD-10-CM

## 2024-06-13 DIAGNOSIS — D18.02 CAVERNOUS HEMANGIOMA OF BRAIN (HCC): ICD-10-CM

## 2024-06-13 DIAGNOSIS — M25.511 CHRONIC PAIN OF BOTH SHOULDERS: ICD-10-CM

## 2024-06-13 DIAGNOSIS — Z00.00 ANNUAL PHYSICAL EXAM: Primary | ICD-10-CM

## 2024-06-13 DIAGNOSIS — G89.4 CHRONIC PAIN SYNDROME: ICD-10-CM

## 2024-06-13 DIAGNOSIS — G25.81 RLS (RESTLESS LEGS SYNDROME): ICD-10-CM

## 2024-06-13 DIAGNOSIS — M25.512 CHRONIC PAIN OF BOTH SHOULDERS: ICD-10-CM

## 2024-06-13 DIAGNOSIS — E66.01 MORBID OBESITY WITH BMI OF 40.0-44.9, ADULT (HCC): ICD-10-CM

## 2024-06-13 DIAGNOSIS — F33.0 MILD RECURRENT MAJOR DEPRESSION (HCC): Chronic | ICD-10-CM

## 2024-06-13 DIAGNOSIS — M46.96 UNSPECIFIED INFLAMMATORY SPONDYLOPATHY, LUMBAR REGION (HCC): ICD-10-CM

## 2024-06-13 DIAGNOSIS — G89.29 CHRONIC PAIN OF BOTH KNEES: ICD-10-CM

## 2024-06-13 DIAGNOSIS — F17.211 CIGARETTE NICOTINE DEPENDENCE IN REMISSION: ICD-10-CM

## 2024-06-13 DIAGNOSIS — M25.562 CHRONIC PAIN OF BOTH KNEES: ICD-10-CM

## 2024-06-13 NOTE — ASSESSMENT & PLAN NOTE
Clinical Course: stable   Good control  Antidepressant Meds: sertraline Tabs - 100 MG  Anxiolytic Meds: busPIRone Tabs - 10 MG

## 2024-06-13 NOTE — ASSESSMENT & PLAN NOTE
Stable, continue present management and continue to monitor for progression source of pain. Continue pain managment

## 2024-06-13 NOTE — ASSESSMENT & PLAN NOTE
Working on wght loss. Continue to monitor and continue present management Wegovy for 8 weeks really helped, but [facility] wants 6 month training course before they will consider. Will try wegovy again, possibely zepbound. Will try mail order instead of local for better deliver and supply. .tb

## 2024-06-13 NOTE — ASSESSMENT & PLAN NOTE
Stable, continue present management and continue to monitor for progression with neurology at [facility] follow up.

## 2024-06-13 NOTE — ASSESSMENT & PLAN NOTE
Franky del castillo meds. Working with [facility] for control. Continue to monitor and continue present management

## 2024-06-13 NOTE — ASSESSMENT & PLAN NOTE
No K+ on file.  No Cr or GFR on file.  BP Meds: amLODIPine Tabs - 5 MG; furosemide Tabs - 40 MG; hydroCHLOROthiazide Tabs - 25 MG; lisinopril Tabs - 20 MG; metoprolol tartrate Tabs - 25 MG

## 2024-06-13 NOTE — PROGRESS NOTES
Subjective:   Iggy Kim is a 54 year old male who presents for a MA (Medicare Advantage) Supervisit (Once per calendar year) and scheduled follow up of multiple significant but stable problems.   Subsequent awv and wegovy follow up. 375 lb to 328 lb on wegovy, now slowly back up to 384.   Because [facility] gained [facility] back, [facility].     History/Other:   Fall Risk Assessment:   He has been screened for Falls and is High Risk. Fall Prevention information provided to patient in After Visit Summary.    Do you feel unsteady when standing or walking?: Yes  Do you worry about falling?: Yes  Have you fallen in the past year?: No     Cognitive Assessment:   Abnormal  What day of the week is this?: Correct  What month is it?: Correct  What year is it?: Correct  Recall \"Ball\": Correct  Recall \"Flag\": Incorrect  Recall \"Tree\": Incorrect hx traumatic brain injury, stable functino    Functional Ability/Status:   Iggy Kim has some abnormal functions as listed below:  He has Hearing problems based on screening of functional status.      Depression Screening (PHQ-2/PHQ-9): PHQ-9 TOTAL SCORE: 4  , done 2024   Feeling down, depressed, or hopeless: 1    Trouble falling or staying asleep, or sleeping too much: 1     Feeling tired or having little energy: 1    Poor appetite or overeatin    If you checked off any problems, how difficult have these problems made it for you to do your work, take care of things at home, or get along with other people?: Somewhat difficult         Advanced Directives:   He does have a Living Will but we do NOT have it on file in Epic.    He does have a POA but we do NOT have it on file in Epic.    Discussed Advance Care Planning with patient (and family/surrogate if present). Standard forms made available to patient in After Visit Summary.      Patient Active Problem List   Diagnosis    Cavernous hemangioma of brain (HCC)    Cigarette nicotine dependence in remission     Chronic pain syndrome    RLS (restless legs syndrome)    Chronic brain injury ([facility])    DDD (degenerative disc disease), lumbosacral    Morbid obesity with BMI of 40.0-44.9, adult ([facility])    Arthritis of facet joint of lumbar spine    Chronic pain of both knees    Chronic pain of both shoulders    Essential hypertension    Opioid dependence with current use ([facility])    Elevated LFTs    Mild recurrent major depression ([facility])    Unspecified inflammatory spondylopathy, lumbar region ([facility])     Allergies:  He is allergic to caffeine and meloxicam.    Current Medications:  Outpatient Medications Marked as Taking for the 6/13/24 encounter (Office Visit) with Arnulfo Andrew MD   Medication Sig    semaglutide-weight management 0.25 MG/0.5ML Subcutaneous Solution Auto-injector Inject 0.5 mL (0.25 mg total) into the skin once a week.    omega-3-acid ethyl esters 1 g Oral Cap     naproxen 500 MG Oral Tab     amoxicillin clavulanate 875-125 MG Oral Tab     furosemide 40 MG Oral Tab     divalproex Sodium  MG Oral Tablet 24 Hr     ACNE MEDICATION 5 5 % External Lotion     hydrocortisone 2.5 % External Ointment     ketoconazole 2 % External Cream     Ketoconazole 2 % External Shampoo     omeprazole 20 MG Oral Capsule Delayed Release     sertraline 100 MG Oral Tab     Sulfacetamide Sodium, Acne, 10 % External Lotion     tiZANidine 4 MG Oral Tab     triamcinolone acetonide 0.1 % External Cream     Diclofenac Sodium 1 % External Gel     VITAMIN D3 125 MCG (5000 UT) Oral Cap     busPIRone 10 MG Oral Tab     Clindamycin Phosphate 1 % External Lotion Apply 1 Application topically 2 (two) times daily.    atorvastatin 80 MG Oral Tab Take 1 tablet (80 mg total) by mouth nightly.    Albuterol Sulfate  (90 Base) MCG/ACT Inhalation Aero Soln Inhale 2 puffs into the lungs every 6 (six) hours as needed for Wheezing.    lisinopril 20 MG Oral Tab Take 1 tablet (20 mg total) by mouth daily.    hydrochlorothiazide 25 MG Oral Tab Take 1  tablet (25 mg total) by mouth daily.    metoprolol Tartrate 25 MG Oral Tab Take 1 tablet (25 mg total) by mouth 2 (two) times daily.    AmLODIPine Besylate 5 MG Oral Tab Take 1 tablet (5 mg total) by mouth daily.    Polyethylene Glycol 3350 (MIRALAX) Oral Powd Pack Take 17 g by mouth daily.    Diclofenac Sodium 1 % Transdermal Gel Apply 1 g topically 2 (two) times daily.    Psyllium (METAMUCIL FIBER OR) Take 1 Applicatorful by mouth daily.    rOPINIRole HCl (REQUIP) 1 MG Oral Tab Take 1 tablet by mouth nightly. At Bedtime    Naproxen Sodium (ALEVE) 220 MG Oral Tab Take 1-2 tablets (220-440 mg total) by mouth Q12H.    Multiple Vitamin (MULTI VITAMIN MENS) Oral Tab Take 1 tablet by mouth daily.    omega-3 fatty acids (FISH OIL) 1000 MG Oral Cap Take 1,000 mg by mouth daily.    Misc Natural Products (OSTEO BI-FLEX ADV DOUBLE ST OR) Take  by mouth.       Medical History:  He  has no past medical history on file.  Surgical History:  He  has a past surgical history that includes other surgical history () and hernia surgery (Right, 2017).   Family History:  His family history includes Hypertension in his father and mother; Stroke in his father.  Social History:  He  reports that he quit smoking about 24 years ago. His smoking use included cigarettes and cigars. He started smoking about 54 years ago. He has a 15 pack-year smoking history. He has never used smokeless tobacco. He reports that he does not drink alcohol and does not use drugs.    Tobacco:  He smoked tobacco in the past but quit greater than 12 months ago.  Social History     Tobacco Use   Smoking Status Former    Current packs/day: 0.00    Average packs/day: 0.5 packs/day for 30.0 years (15.0 ttl pk-yrs)    Types: Cigarettes, Cigars    Start date: 1970    Quit date: 2000    Years since quittin.4   Smokeless Tobacco Never        CAGE Alcohol Screen:   CAGE screening score of 0 on 2024, showing low risk of alcohol abuse.      Patient Care  Team:  Arnulfo Andrew MD as PCP - General (Family Practice)    Review of Systems   Constitutional: Negative.  Negative for activity change, appetite change, chills and fever.   HENT: Negative.     Eyes: Negative.    Respiratory: Negative.  Negative for shortness of breath.    Cardiovascular: Negative.  Negative for chest pain and palpitations.   Gastrointestinal: Negative.  Negative for abdominal pain.   Genitourinary: Negative.  Negative for dysuria.   Musculoskeletal:  Negative for arthralgias.   Skin: Negative.  Negative for rash.   Allergic/Immunologic: Negative.    Neurological: Negative.          Objective:   Physical Exam  Vitals and nursing note reviewed.   Constitutional:       General: He is not in acute distress.     Appearance: Normal appearance. He is obese.   HENT:      Head: Normocephalic and atraumatic.      Right Ear: Tympanic membrane and external ear normal.      Left Ear: Tympanic membrane and external ear normal.      Nose: Nose normal.      Mouth/Throat:      Mouth: Mucous membranes are moist.   Eyes:      Extraocular Movements: Extraocular movements intact.      Pupils: Pupils are equal, round, and reactive to light.   Cardiovascular:      Rate and Rhythm: Normal rate and regular rhythm.      Pulses: Normal pulses.           Carotid pulses are 2+ on the right side and 2+ on the left side.       Radial pulses are 2+ on the right side and 2+ on the left side.        Dorsalis pedis pulses are 2+ on the right side and 2+ on the left side.        Posterior tibial pulses are 2+ on the right side and 2+ on the left side.      Heart sounds: Normal heart sounds, S1 normal and S2 normal. No murmur heard.  Pulmonary:      Effort: Pulmonary effort is normal.      Breath sounds: Normal breath sounds.   Abdominal:      General: Abdomen is flat. Bowel sounds are normal. There is no distension.      Palpations: Abdomen is soft.   Musculoskeletal:         General: Normal range of motion.      Cervical back:  Normal range of motion and neck supple.      Right lower leg: No edema.      Left lower leg: No edema.   Skin:     General: Skin is warm and dry.      Capillary Refill: Capillary refill takes less than 2 seconds.   Neurological:      General: No focal deficit present.      Mental Status: He is alert and oriented to person, place, and time.   Psychiatric:         Mood and Affect: Mood normal.         Behavior: Behavior normal.         Thought Content: Thought content normal.         /86   Pulse 84   Resp 16   Ht 5' 11\" (1.803 m)   Wt (!) 384 lb (174.2 kg)   BMI 53.56 kg/m²  Estimated body mass index is 53.56 kg/m² as calculated from the following:    Height as of this encounter: 5' 11\" (1.803 m).    Weight as of this encounter: 384 lb (174.2 kg).    Medicare Hearing Assessment:   Hearing Screening    Screening Method: Whisper Test  Whisper Test Result: Pass         Visual Acuity:   Right Eye Visual Acuity: Uncorrected Right Eye Chart Acuity: 20/20   Left Eye Visual Acuity: Uncorrected Left Eye Chart Acuity: 20/30   Both Eyes Visual Acuity: Uncorrected Both Eyes Chart Acuity: 20/20   Able To Tolerate Visual Acuity: Yes        Assessment & Plan:   Iggy Kim is a 54 year old male who presents for a Medicare Assessment.     1. Annual physical exam (Primary)  2. Cavernous hemangioma of brain (HCC)  Overview:  Dx 2001, difficulty learning since that point, walks with cane. On disability long term b.o this  Assessment & Plan:  Stable, continue present management and continue to monitor for progression   Orders:  -     Detailed, Mod Complex (77307)  3. Morbid obesity with BMI of 40.0-44.9, adult (HCC)  Overview:  Body mass index is 41.42 kg/m².   Assessment & Plan:  Working on wght loss. Continue to monitor and continue present management Wegovy for 8 weeks really helped, but [facility] wants [facility] training [facility] before they will consider. Will try wegovy again, possibely zepbound. Will try mail order instead  [facility] for better deliver and supply. .tb   Orders:  -     Semaglutide-Weight Management; Inject 0.5 mL (0.25 mg total) into the skin once a week.  Dispense: 6 mL; Refill: 3  -     Tania, Mod Complex ([id number])  4. Opioid dependence with current use (HCC)  Overview:  [facility]   Assessment & Plan:  Stable n meds. Working with [facility] for control. Continue to monitor and continue present management   Orders:  -     Tania, Mod Complex (86171)  5. Mild recurrent major depression (HCC)  Overview:  PHQ-9 score of 5 in 1/2021, no meds  Assessment & Plan:  Clinical Course: stable   Good control  Antidepressant Meds: sertraline Tabs - 100 MG  Anxiolytic Meds: busPIRone Tabs - 10 MG   Orders:  -     Tania, Mod Complex ([id number])  6. Unspecified inflammatory spondylopathy, lumbar region (HCC)  Overview:  [facility] by [facility] there  Assessment & Plan:  Stable, continue present management and continue to monitor for progression source of pain. Continue pain managment  7. Chronic brain injury (HCC)  Overview:  Cavernous hemangioma casuing disability and learning difficulty since Dx in 2001.  Assessment & Plan:  Stable, continue present management and continue to monitor for progression with [facility]y at [facility] follow up.  Orders:  -     Tania, Mod Complex (97321)  8. Essential hypertension  Overview:  Lisinopril 20, metoprolol 25, amlodipine 5  Assessment & Plan:  No K+ on file.  No Cr or GFR on file.  BP Meds: amLODIPine Tabs - 5 MG; furosemide Tabs - 40 MG; hydroCHLOROthiazide Tabs - 25 MG; lisinopril Tabs - 20 MG; metoprolol tartrate Tabs - 25 MG    Orders:  -     Detailed, Mod Complex ([id number])  9. RLS (restless legs syndrome)  Overview:  Ropinirole (requip) 1mg  Assessment & Plan:  Stable, continue present management and continue to monitor for progression   10. Chronic pain syndrome  Overview:  Due to DDD, on [place] 10, #120/month, [facility] managing  Assessment & Plan:  Stable,  continue present management and continue to monitor for progression per [facility]  Orders:  -     Detailed, Mod Complex ([id number])  11. Arthritis of facet joint of lumbar spine  Overview:  Seen 11/2015 L spine filme, chronic pain on meds as needed, chronic back brace  Assessment & Plan:  Stable, continue present management and continue to monitor for progression   Orders:  -     Detailed, Mod Complex ([id number])  12. DDD (degenerative disc disease), lumbosacral  Overview:  Norco 10 # 120/month and alleve, injections in college, no help, uses cane daily.   Assessment & Plan:  Stable, continue present management and continue to monitor for progression   13. Elevated LFTs  Overview:  [facility], better with [facility]  Assessment & Plan:  Stable, continue present management and continue to monitor for progression, following [facility] labs  Orders:  -     Detailed, Mod Complex ([id number])  14. Chronic pain of both shoulders  Overview:  [facility] managing, chronic pain, may be due to crutch use.   Assessment & Plan:  Stable, continue present management and continue to monitor for progression   15. Chronic pain of both knees  Overview:  [facility] managing, on NSAIDS and PT  Assessment & Plan:  Stable, continue present management and continue to monitor for progression   16. Cigarette nicotine dependence in remission  Overview:  Cigarette 1/2 PPD until 2017  Assessment & Plan:  Stable, continue present management and continue to monitor for progression   17. Encounter for annual health examination    The patient indicates understanding of these issues and agrees to the plan.  Reinforced healthy diet, lifestyle, and exercise.      Return in 6 months (on 12/13/2024).     Arnulfo Andrew MD, 6/13/2024     Supplementary Documentation:   General Health:  In the past six months, have you lost more than 10 pounds without trying?: 2 - No  Has your appetite been poor?: No  Type of Diet: Balanced  How does the patient maintain a good energy level?: Appropriate Exercise  How would  you describe your daily physical activity?: Light  How would you describe your current health state?: Fair  How do you maintain positive mental well-being?: Visiting Family  On a scale of 0 to 10, with 0 being no pain and 10 being severe pain, what is your pain level?: 5 - (Moderate)  In the past six months, have you experienced urine leakage?: 0-No  At any time do you feel concerned for the safety/well-being of yourself and/or your children, in your home or elsewhere?: No  Have you had any immunizations at another office such as Influenza, Hepatitis B, Tetanus, or Pneumococcal?: No        Iggy Kim's SCREENING SCHEDULE   Tests on this list are recommended by your physician but may not be covered, or covered at this frequency, by your insurer.   Please check with your insurance carrier before scheduling to verify coverage.   PREVENTATIVE SERVICES FREQUENCY &  COVERAGE DETAILS LAST COMPLETION DATE   Diabetes Screening    Fasting Blood Sugar / Glucose    One screening every 12 months if never tested or if previously tested but not diagnosed with pre-diabetes   One screening every 6 months if diagnosed with pre-diabetes No results found for: \"GLUCOSE\", \"GLU\"     Cardiovascular Disease Screening    Lipid Panel  Cholesterol  Lipoprotein (HDL)  Triglycerides Covered every 5 years for all Medicare beneficiaries without apparent signs or symptoms of cardiovascular disease No results found for: \"CHOLEST\", \"HDL\", \"LDL\", \"LDLD\", \"LDLC\", \"TRIG\"      Electrocardiogram (EKG)   Covered if needed at Welcome to Medicare, and non-screening if indicated for medical reasons -      Ultrasound Screening for Abdominal Aortic Aneurysm (AAA) Covered once in a lifetime for one of the following risk factors    Men who are 65-75 years old and have ever smoked    Anyone with a family history -     Colorectal Cancer Screening  Covered for ages 50-85; only need ONE of the following:    Colonoscopy   Covered every 10 years    Covered  every 2 years if patient is at high risk or previous colonoscopy was abnormal 01/01/2019    Health Maintenance   Topic Date Due    Colorectal Cancer Screening  01/01/2022       Flexible Sigmoidoscopy   Covered every 4 years -    Fecal Occult Blood Test Covered annually -   Prostate Cancer Screening    Prostate-Specific Antigen (PSA) Annually No results found for: \"PSA\"  Health Maintenance   Topic Date Due    PSA  Never done      Immunizations    Influenza Covered once per flu season  Please get every year 09/18/2023  No recommendations at this time    Pneumococcal Each vaccine (Oflrvvn66 & Jeuppaucj36) covered once after 65 Prevnar 13: -    [id number]: 12/16/2015     No recommendations at this time    Hepatitis B One screening covered for patients with certain risk factors   -  No recommendations at this time    Tetanus Toxoid Not covered by Medicare Part B unless medically necessary (cut with metal); may be covered with your pharmacy prescription benefits -    Tetanus, Diptheria and Pertusis TD and TDaP Not covered by Medicare Part B -  DTaP,Tdap,and Td Vaccines(1 - Tdap) Never done    Zoster Not covered by Medicare Part B; may be covered with your pharmacy  prescription benefits -  Zoster Vaccines(1 of 2) Never done     Annual Monitoring of Persistent Medications (ACE/ARB, digoxin diuretics, anticonvulsants)    Potassium Annually No results found for: \"K\", \"POTASSIUM\"      Creatinine   Annually No results found for: \"CREATININE\", \"CREATSERUM\"      BUN Annually No results found for: \"BUN\"    Drug Serum Conc Annually No results found for: \"DIGOXIN\", \"DIG\", \"VALP\"

## 2024-06-14 NOTE — PATIENT INSTRUCTIONS
Iggy Kim's SCREENING SCHEDULE   Tests on this list are recommended by your physician but may not be covered, or covered at this frequency, by your insurer.   Please check with your insurance carrier before scheduling to verify coverage.   PREVENTATIVE SERVICES FREQUENCY &  COVERAGE DETAILS LAST COMPLETION DATE   Diabetes Screening    Fasting Blood Sugar / Glucose    One screening every 12 months if never tested or if previously tested but not diagnosed with pre-diabetes   One screening every 6 months if diagnosed with pre-diabetes No results found for: \"GLUCOSE\", \"GLU\"     Cardiovascular Disease Screening    Lipid Panel  Cholesterol  Lipoprotein (HDL)  Triglycerides Covered every 5 years for all Medicare beneficiaries without apparent signs or symptoms of cardiovascular disease No results found for: \"CHOLEST\", \"HDL\", \"LDL\", \"LDLD\", \"LDLC\", \"TRIG\"      Electrocardiogram (EKG)   Covered if needed at Welcome to Medicare, and non-screening if indicated for medical reasons -      Ultrasound Screening for Abdominal Aortic Aneurysm (AAA) Covered once in a lifetime for one of the following risk factors   • Men who are 65-75 years old and have ever smoked   • Anyone with a family history -     Colorectal Cancer Screening  Covered for ages 50-85; only need ONE of the following:    Colonoscopy   Covered every 10 years    Covered every 2 years if patient is at high risk or previous colonoscopy was abnormal 01/01/2019    Health Maintenance   Topic Date Due   • Colorectal Cancer Screening  01/01/2022       Flexible Sigmoidoscopy   Covered every 4 years -    Fecal Occult Blood Test Covered annually -   Prostate Cancer Screening    Prostate-Specific Antigen (PSA) Annually No results found for: \"PSA\"  Health Maintenance   Topic Date Due   • PSA  Never done      Immunizations    Influenza Covered once per flu season  Please get every year 09/18/2023  No recommendations at this time    Pneumococcal Each vaccine (Iimsrbb92 &  Lbosrveor41) covered once after 65 Prevnar 13: -    [id number]: 12/16/2015     No recommendations at this time    Hepatitis B One screening covered for patients with certain risk factors   -  No recommendations at this time    Tetanus Toxoid Not covered by Medicare Part B unless medically necessary (cut with metal); may be covered with your pharmacy prescription benefits -    Tetanus, Diptheria and Pertusis TD and TDaP Not covered by Medicare Part B -  DTaP,Tdap,and Td Vaccines(1 - Tdap) Never done    Zoster Not covered by Medicare Part B; may be covered with your pharmacy  prescription benefits -  Zoster Vaccines(1 of 2) Never done     Annual Monitoring of Persistent Medications (ACE/ARB, digoxin diuretics, anticonvulsants)    Potassium Annually No results found for: \"K\", \"POTASSIUM\"      Creatinine   Annually No results found for: \"CREATININE\", \"CREATSERUM\"      BUN Annually No results found for: \"BUN\"    Drug Serum Conc Annually No results found for: \"DIGOXIN\", \"DIG\", \"VALP\"

## 2024-06-17 NOTE — TELEPHONE ENCOUNTER
Submitted all information through [employer] portal and received the following response    There was an error with your request  Eligibility could not be verified for this patient - patient not found. Please review patient information and re-submit.

## 2024-06-17 NOTE — TELEPHONE ENCOUNTER
Received fax request from [employer] for prior auth to be completed  for Wegovy pen  Web site referred to CoverMyMeds portal

## 2024-06-25 NOTE — TELEPHONE ENCOUNTER
Pts dad stopped by the office today because he doesn't understand why the PA was denied and would like to talk to someone about it    Paperwork placed in Pats box that pts dad brought in

## 2024-06-26 NOTE — TELEPHONE ENCOUNTER
Submitted through [employer] Portal and received the following electronic reply    Reference Number  [id number]    Status  PENDED    Review Reason 1  Requires Medical Review    Message  This case requires further review. You will be contacted if additional information is needed.    Also uploaded LOV christoph/ Kamran on 06/13/24

## 2024-07-02 NOTE — TELEPHONE ENCOUNTER
Accessed [facility] Medicare portal and researched Wegovy       Received the following information    Wegovy 0.25 mg/0.5 mL subcutaneous pen injector  0.5ML SYRINGE (sold in a package of 4)  1 per month  Every 1 Month    [facility] Not covered  Coverage details  Not covered    [facility] -[phone]  Formulary ID: [id number]

## 2024-07-03 NOTE — TELEPHONE ENCOUNTER
Spoke to patient he told him that [facility] was [facility]. He said he already knew and that the insurance told him too. He was very disappointed but said he can not afford $1000 out of pocket.

## 2025-02-05 PROCEDURE — 99283 EMERGENCY DEPT VISIT LOW MDM: CPT

## 2025-02-05 PROCEDURE — 99284 EMERGENCY DEPT VISIT MOD MDM: CPT

## 2025-02-05 PROCEDURE — 96372 THER/PROPH/DIAG INJ SC/IM: CPT

## 2025-02-06 ENCOUNTER — HOSPITAL ENCOUNTER (EMERGENCY)
Facility: HOSPITAL | Age: 55
Discharge: HOME | End: 2025-02-06
Attending: EMERGENCY MEDICINE
Payer: MEDICARE

## 2025-02-06 VITALS
TEMPERATURE: 99 F | SYSTOLIC BLOOD PRESSURE: 126 MMHG | OXYGEN SATURATION: 95 % | BODY MASS INDEX: 44.1 KG/M2 | WEIGHT: 315 LBS | HEIGHT: 71 IN | RESPIRATION RATE: 20 BRPM | DIASTOLIC BLOOD PRESSURE: 75 MMHG | HEART RATE: 68 BPM
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
## 2025-02-06 DIAGNOSIS — S39.012A BACK STRAIN, INITIAL ENCOUNTER: ICD-10-CM

## 2025-02-06 RX ORDER — HYDROCODONE BITARTRATE AND ACETAMINOPHEN 5; 325 MG/1; MG/1
1-2 TABLET ORAL EVERY 4 HOURS PRN
Qty: 20 TABLET | Refills: 0 | Status: SHIPPED | OUTPATIENT
Start: 2025-02-06 | End: 2025-02-13

## 2025-02-06 RX ORDER — MORPHINE SULFATE 4 MG/ML
4 INJECTION, SOLUTION INTRAMUSCULAR; INTRAVENOUS ONCE
Status: COMPLETED | OUTPATIENT
Start: 2025-02-06 | End: 2025-02-06

## 2025-02-06 RX ORDER — METHYLPREDNISOLONE 4 MG/1
TABLET ORAL
Qty: 21 EACH | Refills: 0 | Status: SHIPPED | OUTPATIENT
Start: 2025-02-06

## 2025-02-06 RX ORDER — CYCLOBENZAPRINE HCL 10 MG
10 TABLET ORAL 3 TIMES DAILY PRN
Qty: 20 TABLET | Refills: 0 | Status: SHIPPED | OUTPATIENT
Start: 2025-02-06 | End: 2025-02-13

## 2025-02-06 RX ORDER — DIAZEPAM 5 MG/1
5 TABLET ORAL ONCE
Status: COMPLETED | OUTPATIENT
Start: 2025-02-06 | End: 2025-02-06

## 2025-02-06 NOTE — ED INITIAL ASSESSMENT (HPI)
Pt slipped and fell on his back. Pt c/o lower back pain and \"heard it go crack\". Denies head injury, -LOC, - blood thinners.

## 2025-02-06 NOTE — ED PROVIDER NOTES
Patient Seen in: [facility] Emergency Department      History     Chief Complaint   Patient presents with    Fall     fall on ice backwards, no LOC but sharp pain and felt pop/\"crack\" no thinners        Stated Complaint: fall on ice backwards, no LOC but sharp pain and felt pop/\"crack\"    Subjective:   HPI      54-year-old male reports that he was taking out the garbage late at night when he slipped on the stairs. He describes stepping over a package, which caused him to take a wide step and subsequently lose his footing. Both feet went out from under him, and he landed directly on his back on the stairs. He heard two distinct cracks during the fall. Currently, he is experiencing significant pain in his lower back. He did not report any other symptoms or previous similar incidents.  Denies any bowel or bladder dysfunction no paresthesias or numbness    Objective:     Past Medical History:    Back pain              Past Surgical History:   Procedure Laterality Date    [facility]    Other surgical history  2001    craniotomy after TBI                Social History     Socioeconomic History    Marital status: Single   Occupational History    Occupation: disabled   Tobacco Use    Smoking status: Former     Current packs/day: 0.00     Average packs/day: 0.5 packs/day for 30.0 years (15.0 ttl pk-yrs)     Types: Cigarettes, Cigars     Start date: 1970     Quit date: 2000     Years since quittin.1    Smokeless tobacco: Never   Vaping Use    Vaping status: Never Used   Substance and Sexual Activity    Alcohol use: No     Alcohol/week: 0.0 standard drinks of alcohol    Drug use: No   Other Topics Concern     Service Yes    Blood Transfusions No    Caffeine Concern No    Exercise No     Comment: gardening     Seat Belt Yes                  Physical Exam     ED Triage Vitals [25 0021]   BP (!) 157/100   Pulse 88   Resp 20   Temp 99 °F (37.2 °C)   Temp src Temporal   SpO2  95 %   O2 Device None (Room air)       Current Vitals:   Vital Signs  BP: (!) 157/100  Pulse: 88  Resp: 20  Temp: 99 °F (37.2 °C)  Temp src: Temporal    Oxygen Therapy  SpO2: 95 %  O2 Device: None (Room air)        Physical Exam    Vital signs reviewed  General appearance: Patient is alert and in moderate to severe pain distress HEENT: Pupils equal react to light extraocular muscles intact no scleral icterus, mucous membranes are moist, there is no erythema or exudate in the posterior pharynx  Neck: Supple no JVD no lymphadenopathy no meningismus no carotid bruit  CV: Regular rate and rhythm no murmur rub  Respiratory: Clear to auscultation bilaterally no crackles no wheezes no accessory muscle use  Abdomen: Soft nontender nondistended, no rebound no guarding  no hepatosplenomegaly bowel sounds are present , no pulsatile mass  Extremities: No clubbing cyanosis or edema 2+ distal pulses.  Neuro: Cranial nerves II through XII intact with no gross focal sensory or motor abnormality.  Back: Patient has paraspinal and spinal tenderness over L4-L5 no deformity palpated    ED Course   Labs Reviewed - No data to display         Patient was evaluated and had an x-ray of the lumbar spine which showed no acute compression fracture or any malalignment.  Does have some degenerative changes.  Patient be given a shot of morphine IM and Valium we will send him home with Norco Flexeril and Medrol Dosepak and he can follow-up with spine.  Told him to ice the affected area.  Return if worse     XR LUMBAR SPINE   Comparison: None available for review.    IMPRESSION:  No definite evidence for acute displaced fracture seen by this modality, however CT is suggested for further evaluation if there is clinical concern for acute osseous injury.  MDM      Differential diagnosis reflecting the complexity of care include: Compression fracture, herniated disc, back sprain    Comorbidities that add complexity to management include: Chronic  pain      My independent interpretation of studies of: X-ray shows no acute fracture no displacement.    Diagnostic tests and medications considered but not ordered were: MRI of lumbar spine was considered but feel this could be done as an outpatient    Social determinants of health that affect care: Patient does take narcotics frequently for chronic pain    Shared decision making was done by myself patient and his friend.  They will get a ride home.  He was given 1 shot of morphine and some Valium and will be sent home with pain medication.  Ice and stretch and follow-up with spine          Medical Decision Making      Disposition and Plan     Clinical Impression:  1. Fall on ice    2. Back strain, initial encounter         Disposition:  Discharge  2/6/2025  4:08 am    Follow-up:  Cristian Pereyra MD  [address], [place] [address]  [phone]    Follow up            Medications Prescribed:  Current Discharge Medication List        START taking these medications    Details   cyclobenzaprine 10 MG Oral Tab Take 1 tablet (10 mg total) by mouth 3 (three) times daily as needed for Muscle spasms.  Qty: 20 tablet, Refills: 0    Associated Diagnoses: Fall on ice      HYDROcodone-acetaminophen 5-325 MG Oral Tab Take 1-2 tablets by mouth every 4 (four) hours as needed for Pain.  Qty: 20 tablet, Refills: 0    Associated Diagnoses: Fall on ice      methylPREDNISolone 4 MG Oral Tablet Therapy Pack Dosepack: use as directed on packaging  Qty: 21 each, Refills: 0    Associated Diagnoses: Fall on ice                 Supplementary Documentation: Patient was screened and evaluated during this visit.  As the treating physician attending to the patient, I determined within reasonable clinical confidence and prior to discharge, that an emergency medical condition was not or was no longer present.  There was no indication for further evaluation, treatment, or admission on an emergency basis.  Comprehensive  verbal and written discharge and follow-up instructions were provided to help prevent relapse or worsening.  Patient was instructed to follow-up with primary care provider for further evaluation treatment, return immediately to ER for worsening, concerning, new, or changing/persisting symptoms.  I discussed the case with the patient and they had no questions, complaints, or concerns.  Patient was comfortable going home.      Dictation Disclaimer Note:   To increase efficiency this document may have been prepared using voice recognition technology. Every effort has been made to correct any errors made during preparation of this note. However, if a word or phrase is confusing, or does not make sense, this is likely due to a recognition error within the program which was not discovered during editing. Please do not hesitate to contact to address any significant errors.    Note to Patient:   The 21st Century Cures Act makes medical notes like these available to patients in the interest of transparency. Please be advised this is a medical document. Medical documents are intended to carry relevant information, facts as evident, and the clinical opinion of the practitioner. The medical note is intended as peer to peer communication and may appear blunt or direct. It is written in medical language and may contain abbreviations or verbiage that are unfamiliar.

## 2025-02-07 ENCOUNTER — PATIENT OUTREACH (OUTPATIENT)
Dept: CASE MANAGEMENT | Age: 55
End: 2025-02-07

## 2025-02-07 ENCOUNTER — TELEPHONE (OUTPATIENT)
Dept: FAMILY MEDICINE CLINIC | Facility: CLINIC | Age: 55
End: 2025-02-07

## 2025-02-07 NOTE — TELEPHONE ENCOUNTER
Spoke to patient for Transitions of Care call today.    The patient is scheduled for an ER follow up with Dr. Andrew on 02/17/2025. Patient declined sooner appointment stating he is in a lot of pain from the fall he had.   The patient is aware he needs to follow up with a Neurosurgeon. Patient [facility] that [facility] and patient agreed to call his Neurosurgeon today to inform him of the fall he had and to schedule a follow up appointment.       ER Follow-up appointment needed by 02/13/2025.  Please advise.    BOOK BY DATE: 02/13/2025    Clinical staff:  FYI- patient declined sooner appointment for ER follow up. Patient will call to schedule with [facility]. Thank you!       Future Appointments   Date Time Provider Department Center   2/17/2025  4:15 PM Arnulfo Andrew MD [facility] [place]   6/13/2025  2:45 PM Arnulfo Andrew MD [facility] [place]        33-year-old female with past medical history of anemia and gastric sleeve done on May 5 presents to ED for 2 days of nausea vomiting and weakness.  Patient states when she stands up she gets lightheaded.  No diarrhea no constipation no abdominal pain no fever no chills.  No CP, SOB. Patient initially did well after gastric sleeve with liquids and had a difficult time with solids so she went back to [facility].  Patient's been following up with her surgeon.  past surgery: tubal ligation     Const: NAD  Eyes: PERRL, no conjunctival injection  HENT:  Neck supple without meningismus   CV: RRR, Warm, well-perfused extremities  RESP: CTA B/L, no tachypnea   GI: soft, non-tender, non-distended  MSK: No gross deformities appreciated  Skin: Warm, dry. No rashes  Neuro: Alert, CNs II-XII grossly intact. Sensation and motor function of extremities grossly intact.  Psych: Appropriate mood and affect.    will do labs, give fluids and nausea medications

## 2025-02-07 NOTE — PROGRESS NOTES
Transitions of Care Navigation  Discharge Date: 25  Contact Date: 2025    Transitions of Care Assessment:  JAVY Initial Assessment    General:  Assessment completed with: Patient  Patient Subjective: Spoke with patient who reports he is having extreme pain today mostly in the lower back. He rates his pain a 8/10 at rest and a 10/10 with activity. Having difficulty moving. He is taking Norco as needed. Is applying heat. He was not able to  his new medications but states that they were overnight shipped to him and he is supposed to receive them today. (See Nursing interventions)  Chief Complaint: Primary Diagnosis Fall on ice  Back strain, initial encounter  Verify patient name and  with patient/ caregiver: Yes    Hospital Stay/Discharge:  Tell me what you understand of why you were in the hospital or emergency department: Fall  Prior to leaving the hospital were your Discharge Instructions reviewed with you?: Yes  Did you receive a copy of your written Discharge Instructions?: Yes  What questions do you have about your Discharge Instructions?: Patient denies  Do you feel better or worse since you left the hospital or emergency department?: Same    Follow - Up Appointment:  Do you have a follow-up appointment?: No  Are there any barriers to getting to your follow-up appointment?: No    Home Health/DME:  Prior to leaving the hospital was Home Health (HH) arranged for you?: N/A  Are HH needs identified by staff during the assessment?: No     Prior to leaving the hospital or emergency department was Durable Medical Equipment (DME), medical supplies, or infusions arranged for you?: N/A  Are DME/medical supply/infusions needs identified by staff during this assessment?: No     Medications/Diet:  Did any of your medications change, during or after your hospital stay or ED visit?: Yes  Do you have your new or updated medications?: No  Do you understand what your medications are for and possible side  effects?: Yes  Are there any reasons that keep you from taking your medication as prescribed?: No  Any concerns about medication refills?: No    Were you given a different diet per your Discharge Instructions?: No     Questions/Concerns:  Do you have any questions or concerns that have not been discussed?: No       Nursing Interventions:    The patient states [facility] who he follows up regularly for his back pain. He states that he just had an appointment last week for his back and had xrays done. NCM advised patient to call [facility] to inform them of his fall from yesterday and to schedule a follow up visit. He verbalized agreement with this.   NCM discussed with patient alternating between heat and ice therapy throughout the day. Provided education on the newly prescribed medications. The patient states he is [facility].   ER follow up appointment offered however patient states he is in so much pain unable to come into the office. The soonest appointment patient agreed to was 02/17/2025.  All d/c instructions reviewed with pt.  Reviewed when to call MD vs when to go to ER/call 911.  Educated pt on the importance of taking all meds as prescribed as well as close f/u with PCP/specialists.  Pt verbalized understanding and will contact office with any further questions or concerns.       Medications:  Medication Reconciliation:  I am aware of an inpatient discharge within the last 30 days.  The discharge medication list has been reconciled with the patient's current medication list and reviewed by me. See medication list for additions of new medication, and changes to current doses of medications and discontinued medications.  Current Outpatient Medications   Medication Sig Dispense Refill    cyclobenzaprine 10 MG Oral Tab Take 1 tablet (10 mg total) by mouth 3 (three) times daily as needed for Muscle spasms. 20 tablet 0     HYDROcodone-acetaminophen 5-325 MG Oral Tab Take 1-2 tablets by mouth every 4 (four) hours as needed for Pain. 20 tablet 0    methylPREDNISolone 4 MG Oral Tablet Therapy Pack Dosepack: use as directed on packaging 21 each 0    semaglutide-weight management 0.25 MG/0.5ML Subcutaneous Solution Auto-injector Inject 0.5 mL (0.25 mg total) into the skin once a week. 6 mL 3    omega-3-acid ethyl esters 1 g Oral Cap       naproxen 500 MG Oral Tab       amoxicillin clavulanate 875-125 MG Oral Tab       furosemide 40 MG Oral Tab       divalproex Sodium  MG Oral Tablet 24 Hr       ACNE MEDICATION 5 5 % External Lotion       hydrocortisone 2.5 % External Ointment       ketoconazole 2 % External Cream       Ketoconazole 2 % External Shampoo       omeprazole 20 MG Oral Capsule Delayed Release       sertraline 100 MG Oral Tab       Sulfacetamide Sodium, Acne, 10 % External Lotion       tiZANidine 4 MG Oral Tab       triamcinolone acetonide 0.1 % External Cream       Diclofenac Sodium 1 % External Gel       VITAMIN D3 125 MCG (5000 UT) Oral Cap       busPIRone 10 MG Oral Tab       Clindamycin Phosphate 1 % External Lotion Apply 1 Application topically 2 (two) times daily. 60 mL 2    atorvastatin 80 MG Oral Tab Take 1 tablet (80 mg total) by mouth nightly.      Albuterol Sulfate  (90 Base) MCG/ACT Inhalation Aero Soln Inhale 2 puffs into the lungs every 6 (six) hours as needed for Wheezing.      lisinopril 20 MG Oral Tab Take 1 tablet (20 mg total) by mouth daily.      hydrochlorothiazide 25 MG Oral Tab Take 1 tablet (25 mg total) by mouth daily.      metoprolol Tartrate 25 MG Oral Tab Take 1 tablet (25 mg total) by mouth 2 (two) times daily.      AmLODIPine Besylate 5 MG Oral Tab Take 1 tablet (5 mg total) by mouth daily.      Polyethylene Glycol 3350 (MIRALAX) Oral Powd Pack Take 17 g by mouth daily.      Diclofenac Sodium 1 % Transdermal Gel Apply 1 g topically 2 (two) times daily.      Psyllium (METAMUCIL FIBER OR)  Take 1 Applicatorful by mouth daily.      rOPINIRole HCl (REQUIP) 1 MG Oral Tab Take 1 tablet by mouth nightly. At Bedtime 90 tablet 3    Naproxen Sodium (ALEVE) 220 MG Oral Tab Take 1-2 tablets (220-440 mg total) by mouth Q12H.      Multiple Vitamin (MULTI VITAMIN MENS) Oral Tab Take 1 tablet by mouth daily.      omega-3 fatty acids (FISH OIL) 1000 MG Oral Cap Take 1,000 mg by mouth daily.      Misc Natural Products (OSTEO BI-FLEX ADV DOUBLE ST OR) Take  by mouth.         Follow-up Appointments:  Your appointments       Date & Time Appointment Department ([place])    Jun 13, 2025 2:45 PM CDT MA Supervisit with Arnulfo Andrew MD [facility] ([facility])              [facility]  [address]  [facility] [address]  [phone]            Transitional [facility] Clinic  Was TCC Ordered: No      Primary Care Provider (If no TCC appointment)  Does patient already have a PCP appointment scheduled? No  Nurse Care Manager Scheduled PCP office ER Follow-up appointment with patient     Specialist  Does the patient have any other follow-up appointment(s) need to be scheduled? Yes   -If yes: Nurse Care Manager reviewed upcoming specialist appointments with patient: Yes   -Does the patient need assistance scheduling appointment(s): No      Book By Date: 02/13/2025

## 2025-02-16 NOTE — ASSESSMENT & PLAN NOTE
Cavernous hemangioma casuing disability and learning difficulty since Dx in 2001, stable source of disability. Continue to monitor and continue present management

## 2025-02-16 NOTE — ASSESSMENT & PLAN NOTE
BP shows good control with last BP of 132/70. Continue lifestyle changes, diet, exercise and weight loss.   No K, Cr or eGFR on file.  BP Meds: amLODIPine Tabs - 5 MG; furosemide Tabs - 40 MG; hydroCHLOROthiazide Tabs - 25 MG; lisinopril Tabs - 20 MG; metoprolol tartrate Tabs - 25 MG   ACE/ARB Adherence Poor at 0% [facility] so fully compliant

## 2025-02-16 NOTE — ASSESSMENT & PLAN NOTE
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 Continue present management.   Antidepressant Meds: sertraline Tabs - 100 MG  Anxiolytic Meds: busPIRone Tabs - 10 MG  .

## 2025-02-16 NOTE — ASSESSMENT & PLAN NOTE
Diagnosis in 2001, source of disability and weakness. Stable, continue present management and continue to monitor for progression

## 2025-02-16 NOTE — ASSESSMENT & PLAN NOTE
Last BMI: 49.09, Class 3 Obesity.  He was counseled on diet and exercise for elevated BMI which is affecting his hypertension and osteoarthritis.

## 2025-02-17 ENCOUNTER — TELEPHONE (OUTPATIENT)
Dept: CASE MANAGEMENT | Facility: HOSPITAL | Age: 55
End: 2025-02-17

## 2025-02-17 ENCOUNTER — OFFICE VISIT (OUTPATIENT)
Dept: FAMILY MEDICINE CLINIC | Facility: CLINIC | Age: 55
End: 2025-02-17
Payer: MEDICARE

## 2025-02-17 VITALS
WEIGHT: 315 LBS | BODY MASS INDEX: 44.1 KG/M2 | SYSTOLIC BLOOD PRESSURE: 132 MMHG | RESPIRATION RATE: 16 BRPM | HEIGHT: 71 IN | DIASTOLIC BLOOD PRESSURE: 70 MMHG | HEART RATE: 80 BPM

## 2025-02-17 DIAGNOSIS — F11.20 OPIOID DEPENDENCE WITH CURRENT USE (HCC): ICD-10-CM

## 2025-02-17 DIAGNOSIS — S06.9XAS CHRONIC BRAIN INJURY: ICD-10-CM

## 2025-02-17 DIAGNOSIS — D18.02 CAVERNOUS HEMANGIOMA OF BRAIN (HCC): ICD-10-CM

## 2025-02-17 DIAGNOSIS — E66.01 MORBID OBESITY WITH BMI OF 40.0-44.9, ADULT (HCC): ICD-10-CM

## 2025-02-17 DIAGNOSIS — S39.012D BACK STRAIN, SUBSEQUENT ENCOUNTER: Primary | ICD-10-CM

## 2025-02-17 DIAGNOSIS — I10 ESSENTIAL HYPERTENSION: ICD-10-CM

## 2025-02-17 DIAGNOSIS — F33.0 MILD RECURRENT MAJOR DEPRESSION: Chronic | ICD-10-CM
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
## 2025-02-17 PROCEDURE — 99214 OFFICE O/P EST MOD 30 MIN: CPT | Performed by: FAMILY MEDICINE

## 2025-02-17 PROCEDURE — 3075F SYST BP GE 130 - 139MM HG: CPT | Performed by: FAMILY MEDICINE

## 2025-02-17 PROCEDURE — 3008F BODY MASS INDEX DOCD: CPT | Performed by: FAMILY MEDICINE

## 2025-02-17 PROCEDURE — 3078F DIAST BP <80 MM HG: CPT | Performed by: FAMILY MEDICINE

## 2025-02-17 PROCEDURE — G2211 COMPLEX E/M VISIT ADD ON: HCPCS | Performed by: FAMILY MEDICINE

## 2025-02-17 RX ORDER — OXYCODONE HYDROCHLORIDE 10 MG/1
10 TABLET ORAL EVERY 4 HOURS PRN
Qty: 30 TABLET | Refills: 0 | Status: SHIPPED | OUTPATIENT
Start: 2025-02-17

## 2025-02-17 RX ORDER — TIRZEPATIDE 7.5 MG/.5ML
INJECTION, SOLUTION SUBCUTANEOUS
COMMUNITY

## 2025-02-17 RX ORDER — PREDNISONE 10 MG/1
TABLET ORAL
Qty: 35 TABLET | Refills: 0 | Status: SHIPPED | OUTPATIENT
Start: 2025-02-17 | End: 2025-03-09

## 2025-02-17 NOTE — CM/SW NOTE
Patient states he got a letter saying he had an appointment with a \"back\" doctor today (he thinks)  Patient states he gets all of his care at [place].  Patient does not want to get charged for  \"no show\" to the appointment.  Per patient's chart, patient scheduled an appt with Dr. Andrew today through the Transitions Care team on 2/7/25.  Patient does not wish to follow up with Dr. Andrew.  Patient was provided with Dr. Andrew' office number and MONAE Summers from [facility] phone number.

## 2025-02-17 NOTE — PROGRESS NOTES
Subjective:   Iggy Kim is a 54 year old male who presents for a MA AHA (Medicare Advantage Annual Health Assessment) and Subsequent Annual Wellness visit (Pt already had Initial Annual Wellness) and scheduled follow up of multiple significant but stable problems.     Fell on ice 2/6, hurts a lot. Carrying father's adult diapers, did not see box on fron t porch, both legs went strainght up and landed on full on lower back, could not move initially because of pain. ED notes recired.     Films revieweding.   Baseline Norco 5 q6, increased to 10 mg q4 for this. Norco at higher level still not helping, not leaving bed for last 10 days other than today.         Patient Active Problem List   Diagnosis    Cavernous hemangioma of brain (HCC)    Cigarette nicotine dependence in remission    Chronic pain syndrome    RLS (restless legs syndrome)    Chronic brain injury    DDD (degenerative disc disease), lumbosacral    Morbid obesity with BMI of 40.0-44.9, adult ([facility])    Arthritis of facet joint of lumbar spine    Chronic pain of both knees    Chronic pain of both shoulders    Essential hypertension    Opioid dependence with current use ([facility])    Elevated LFTs    Mild recurrent major depression    Unspecified inflammatory spondylopathy, lumbar region     Allergies:  He is allergic to caffeine and meloxicam.    Current Medications:      Medical History:  He  has a past medical history of Back pain.  Surgical History:  He  has a past surgical history that includes other surgical history (2001) and hernia surgery (Right, 2017).   Family History:  His family history includes Hypertension in his father and mother; Stroke in his father.  Social History:  He  reports that he quit smoking about 25 years ago. His smoking use included cigarettes and cigars. He started smoking about 55 years ago. He has a 15 pack-year smoking history. He has never used smokeless tobacco. He reports that he does not drink alcohol and does not use  drugs.    Tobacco:  He smoked tobacco in the past but quit greater than 12 months ago.  Social History     Tobacco Use   Smoking Status Former    Current packs/day: 0.00    Average packs/day: 0.5 packs/day for 30.0 years (15.0 ttl pk-yrs)    Types: Cigarettes, Cigars    Start date: 1970    Quit date: 2000    Years since quittin.1   Smokeless Tobacco Never        CAGE Alcohol Screen:   Cage screening not needed because reported NO to Alcohol use on social history.      Patient Care Team:  Arnulfo Andrew MD as PCP - General (Family Practice)  Melina Moya RN as [facility] Care Manager (Care Management)    Review of Systems   Constitutional:  Positive for fatigue. Negative for activity change, appetite change, chills and fever.   HENT: Negative.     Eyes: Negative.    Respiratory: Negative.  Negative for shortness of breath.    Cardiovascular: Negative.  Negative for chest pain and palpitations.   Gastrointestinal: Negative.  Negative for abdominal pain.   Genitourinary: Negative.  Negative for dysuria.   Musculoskeletal:  Positive for back pain. Negative for arthralgias and joint swelling.   Skin: Negative.  Negative for rash.   Allergic/Immunologic: Negative.    Neurological:  Positive for weakness. Negative for light-headedness, numbness and headaches.   Psychiatric/Behavioral:  Negative for behavioral problems and confusion.        Objective:   Physical Exam  Vitals and nursing note reviewed.   Constitutional:       General: He is not in acute distress.     Appearance: Normal appearance. He is well-developed.   HENT:      Head: Normocephalic and atraumatic.   Cardiovascular:      Rate and Rhythm: Normal rate and regular rhythm.      Pulses:           Posterior tibial pulses are 2+ on the right side and 2+ on the left side.      Heart sounds: Normal heart sounds. No murmur heard.  Pulmonary:      Effort: Pulmonary effort is normal. No respiratory distress.      Breath sounds: Normal breath sounds. No  wheezing.   Abdominal:      General: Bowel sounds are normal.      Palpations: Abdomen is soft.      Tenderness: There is no abdominal tenderness.   Musculoskeletal:         General: Normal range of motion.      Cervical back: Normal range of motion.      Right lower leg: No edema.      Left lower leg: No edema.   Skin:     General: Skin is warm and dry.      Findings: No rash.   Neurological:      Mental Status: He is alert and oriented to person, place, and time.   Psychiatric:         Mood and Affect: Mood normal.         Behavior: Behavior normal.         Thought Content: Thought content normal.         Judgment: Judgment normal.     Diggiclut with moving. Slow gait.     /70   Pulse 80   Resp 16   Ht 5' 11\" (1.803 m)   Wt (!) 352 lb (159.7 kg)   BMI 49.09 kg/m²  Estimated body mass index is 49.09 kg/m² as calculated from the following:    Height as of this encounter: 5' 11\" (1.803 m).    Weight as of this encounter: 352 lb (159.7 kg).    Medicare Hearing Assessment:   Hearing Screening    Screening Method: Whisper Test  Whisper Test Result: Pass         Visual Acuity:   Right Eye Visual Acuity: Uncorrected Right Eye Chart Acuity: 20/20   Left Eye Visual Acuity: Uncorrected Left Eye Chart Acuity: 20/20   Both Eyes Visual Acuity: Uncorrected Both Eyes Chart Acuity: 20/20   Able To Tolerate Visual Acuity: Yes        Assessment & Plan:   Iggy Kim is a 54 year old male who presents for a Medicare Assessment.     Assessment & Plan  Fall on ice  In a lot of pain, cautiously increase meds. Pred pack and reassess in 1-2 days.   Orders:    predniSONE; Take 1 tablet (10 mg total) by mouth 3 (three) times daily for 5 days, THEN 1 tablet (10 mg total) 2 (two) times daily for 5 days, THEN 1 tablet (10 mg total) daily for 5 days, THEN 0.5 tablets (5 mg total) daily for 5 days.  Dispense: 35 tablet; Refill: 0    oxyCODONE HCl; Take 1 tablet (10 mg total) by mouth every 4 (four) hours as needed for Pain  (severe pain related to fall on ice).  Dispense: 30 tablet; Refill: 0    Cavernous hemangioma of brain (HCC)  Diagnosis in 2001, source of disability and weakness. Stable, continue present management and continue to monitor for progression          Opioid dependence with current use ([facility])  [facility]. Workgin on [employer], no sign of abuse         Mild recurrent major depression   Major Depressive Disorder, Last PHQ score 4, on 6/13/2024,  , Severity: Mild/remission (PHQ 0-4)  Recurrent episode currently active  Clinical Course: stable Good control  Currently stable and controlled with the current treatment plan. Continue present management.   Antidepressant Meds: sertraline Tabs - 100 MG  Anxiolytic Meds: busPIRone Tabs - 10 MG  .          Chronic brain injury  Cavernous hemangioma casuing disability and learning difficulty since Dx in 2001, stable source of disability. Continue to monitor and continue present management          Morbid obesity with BMI of 40.0-44.9, adult ([facility])  Last BMI: 49.09, Class 3 Obesity.  He was counseled on diet and exercise for elevated BMI which is affecting his hypertension and osteoarthritis.          Essential hypertension  BP shows good control with last BP of 132/70. Continue lifestyle changes, diet, exercise and weight loss.   No K, Cr or eGFR on file.  BP Meds: amLODIPine Tabs - 5 MG; furosemide Tabs - 40 MG; hydroCHLOROthiazide Tabs - 25 MG; lisinopril Tabs - 20 MG; metoprolol tartrate Tabs - 25 MG   ACE/ARB Adherence Poor at 0% etting meds through [facility] so fully compliant           Back strain, subsequent encounter  Trial pain meds reassess in a few days. PT if no change.   Orders:    predniSONE; Take 1 tablet (10 mg total) by mouth 3 (three) times daily for 5 days, THEN 1 tablet (10 mg total) 2 (two) times daily for 5 days, THEN 1 tablet (10 mg total) daily for 5 days, THEN 0.5 tablets (5 mg total) daily for 5 days.  Dispense: 35 tablet; Refill: 0    oxyCODONE  HCl; Take 1 tablet (10 mg total) by mouth every 4 (four) hours as needed for Pain (severe pain related to fall on ice).  Dispense: 30 tablet; Refill: 0       The patient indicates understanding of these issues and agrees to the plan.  Reinforced healthy diet, lifestyle, and exercise.      Return in about 6 weeks (around 3/31/2025) for AHA (Annual Health Assessment) visit (Supervisit).     Arnulfo Andrew MD, 2/17/2025     Supplementary Documentation:   General Health:       Health Maintenance   Topic Date Due    DTaP,Tdap,and Td Vaccines (1 - Tdap) Never done    PSA  Never done    Zoster Vaccines (1 of 2) Never done    Colorectal Cancer Screening  01/01/2022    Annual Well Visit  01/01/2025    Annual Depression Screening  01/01/2025    Influenza Vaccine  Completed    Pneumococcal Vaccine: 50+ Years  Completed    COVID-19 Vaccine  Completed    Meningococcal B Vaccine  Aged Out

## 2025-03-11 DIAGNOSIS — S39.012D BACK STRAIN, SUBSEQUENT ENCOUNTER: ICD-10-CM
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
## 2025-03-13 RX ORDER — PREDNISONE 10 MG/1
TABLET ORAL
Qty: 35 TABLET | Refills: 0 | Status: SHIPPED | OUTPATIENT
Start: 2025-03-13

## 2025-03-13 NOTE — TELEPHONE ENCOUNTER
Refill request for:    Requested Prescriptions     Pending Prescriptions Disp Refills    PREDNISONE 10 MG Oral Tab [Pharmacy Med Name: PREDNISONE 10 MG TABLET] 35 tablet 0     Sig: TAKE 1 TABLET BY MOUTH 3 TIMES DAILY FOR 5 DAYS, THEN 1 TABLET 2 TIMES DAILY FOR 5 DAYS, THEN 1 TABLET DAILY FOR 5 DAYS, THEN 0.5 TABLET (5 MG TOTAL) DAILY FOR 5 DAYS.        Last Prescribed Quantity Refills   2/17/25 35 0     LOV 2/17/2025     Patient was asked to follow-up in: 6 weeks     Appointment due: March 2025    Appointment scheduled: 4/9/2025 Arnulfo Andrew MD    Medication not on protocol.     # 35 with 0 refills routed to Arnulfo Andrew MD for review

## 2025-04-08 NOTE — ASSESSMENT & PLAN NOTE
BP shows good control with last BP of 128/68. Continue lifestyle changes, diet, exercise and weight loss.   No K, Cr or eGFR on file.  BP Meds: amLODIPine Tabs - 5 MG; furosemide Tabs - 40 MG; hydroCHLOROthiazide Tabs - 25 MG; lisinopril Tabs - 20 MG; metoprolol tartrate Tabs - 25 MG   Aoff lisinopril continue to monitor and continue present management

## 2025-04-08 NOTE — ASSESSMENT & PLAN NOTE
Last BMI: 45.77, Class 3 Obesity.  He was counseled on diet and exercise for elevated BMI which is affecting his hypertension and osteoarthritis.

## 2025-04-09 ENCOUNTER — OFFICE VISIT (OUTPATIENT)
Dept: FAMILY MEDICINE CLINIC | Facility: CLINIC | Age: 55
End: 2025-04-09
Payer: MEDICARE

## 2025-04-09 VITALS
BODY MASS INDEX: 44.1 KG/M2 | HEART RATE: 70 BPM | WEIGHT: 315 LBS | HEIGHT: 71 IN | RESPIRATION RATE: 16 BRPM | DIASTOLIC BLOOD PRESSURE: 68 MMHG | SYSTOLIC BLOOD PRESSURE: 128 MMHG

## 2025-04-09 DIAGNOSIS — M51.370 DEGENERATION OF INTERVERTEBRAL DISC OF LUMBOSACRAL REGION WITH DISCOGENIC BACK PAIN: ICD-10-CM

## 2025-04-09 DIAGNOSIS — F11.20 OPIOID DEPENDENCE WITH CURRENT USE (HCC): ICD-10-CM

## 2025-04-09 DIAGNOSIS — F33.0 MILD RECURRENT MAJOR DEPRESSION: Chronic | ICD-10-CM

## 2025-04-09 DIAGNOSIS — R56.9 PARTIAL SEIZURES (HCC): ICD-10-CM

## 2025-04-09 DIAGNOSIS — R79.89 ELEVATED LFTS: ICD-10-CM

## 2025-04-09 DIAGNOSIS — G89.4 CHRONIC PAIN SYNDROME: ICD-10-CM

## 2025-04-09 DIAGNOSIS — S06.9XAS CHRONIC BRAIN INJURY: ICD-10-CM

## 2025-04-09 DIAGNOSIS — Z00.00 ANNUAL PHYSICAL EXAM: Primary | ICD-10-CM

## 2025-04-09 DIAGNOSIS — D18.02 CAVERNOUS HEMANGIOMA OF BRAIN (HCC): ICD-10-CM

## 2025-04-09 DIAGNOSIS — E66.01 MORBID OBESITY WITH BMI OF 40.0-44.9, ADULT (HCC): ICD-10-CM

## 2025-04-09 DIAGNOSIS — I10 ESSENTIAL HYPERTENSION: ICD-10-CM

## 2025-04-09 PROBLEM — M51.26 HERNIATION OF LUMBAR INTERVERTEBRAL DISC WITHOUT MYELOPATHY: Status: ACTIVE | Noted: 2025-04-09

## 2025-04-09 RX ORDER — CARBAMAZEPINE 200 MG/1
200 TABLET ORAL
COMMUNITY
Start: 2024-11-15

## 2025-04-09 RX ORDER — ASPIRIN 81 MG/1
81 TABLET ORAL
COMMUNITY
Start: 2025-02-20

## 2025-04-09 RX ORDER — GABAPENTIN 300 MG/1
300 CAPSULE ORAL
COMMUNITY
Start: 2025-03-28

## 2025-04-09 NOTE — PROGRESS NOTES
The following individual(s) verbally consented to be recorded using ambient AI listening technology and understand that they can each withdraw their consent to this listening technology at any point by asking the clinician to turn off or pause the recording:    Patient name: Iggy Kim

## 2025-04-09 NOTE — PROGRESS NOTES
Subjective:   Iggy Kim is a 54 year old male who presents for a MA AHA (Medicare Advantage Annual Health Assessment) and Subsequent Annual Wellness visit (Pt already had Initial Annual Wellness) and scheduled follow up of multiple significant but stable problems.   History of Present Illness  Iggy Kim is a 54 year old male who presents for an annual wellness visit.    He experiences persistent pain in the lower left back, specifically around the hip to the left of the spine. The pain has remained stable over time. Additionally, he has intermittent pain on the right side, which worsens with walking.    He has worsening knee pain, particularly since losing weight. The pain in his knees has increased as he has lost weight. Recent imaging in December 2024 indicated mild to moderate medial knee narrowing. The right knee was previously worse than the left, but recent assessments have shown changes in severity.    He experiences pain on the left side of his head, similar to the pain he had prior to a previous surgery for blood clots. He was informed post-surgery that the blood clots had shrunk, but the pain has recently returned. He has not had recent head imaging, with the last being sometime last year.    He is concerned about running out of pain medication and the impact on his pain levels, which he describes as reaching a level where he is 'crying' when without medication. He is taking hydrocodone, typically four per day, and has been supplementing with ibuprofen or naproxen to manage pain. He is cautious about the potential liver impact of these medications. [facility], which provides a 28-day supply of hydrocodone, leading to gaps in medication coverage.    He is currently on Zepbound at a dose of 10 mg, which he started three weeks ago, for weight loss. He has lost weight, now weighing 325 pounds, down from a previous high of 384 pounds.  Doing  better. And lsogin weight, down form 384.   History/Other:   Fall Risk Assessment:   He has been screened for Falls and is High Risk. Fall Prevention information provided to patient in After Visit Summary.    Do you feel unsteady when standing or walking?: Yes  Do you worry about falling?: Yes  Have you fallen in the past year?: Yes  How many times have you fallen?: 3  Were you injured?: No   Wt Readings from Last 5 Encounters:   04/09/25 (!) 328 lb 3.2 oz (148.9 kg)   02/17/25 (!) 352 lb (159.7 kg)   02/06/25 (!) 339 lb (153.8 kg)   06/13/24 (!) 384 lb (174.2 kg)   09/18/23 (!) 346 lb 6.4 oz (157.1 kg)      Cognitive Assessment:   Abnormal  What day of the week is this?: Correct  What month is it?: Correct  What year is it?: Correct  Recall \"Ball\": Correct  Recall \"Flag\": Incorrect  Recall \"Tree\": Correct    Functional Ability/Status:   Iggy Kim has some abnormal functions as listed below:  He has Eating difficulties based on screening of functional status.He has Meal Preparation difficulties based on screening of functional status.He has difficulties Managing Money/Bills based on screening of functional status.He has difficulties Shopping for Groceries based on screening of functional status. He has difficulties Taking Meds as Rx'd based on screening of functional status. He has Hearing problems based on screening of functional status.He has Vision problems based on screening of functional status. He has Walking problems based on screening of functional status. He has problems with Daily Activities based on screening of functional status. He has problems with Memory based on screening of functional status.       Depression Screening (PHQ):  PHQ-9 TOTAL SCORE: 8  , done 4/9/2025   Little interest or pleasure in doing things: 1    Feeling down, depressed, or hopeless: 1    Trouble falling or staying asleep, or sleeping too much: 1     Feeling tired or having little energy: 1    Poor appetite or  overeatin    Feeling bad about yourself - or that you are a failure or have let yourself or your family down: 1    Trouble concentrating on things, such as reading the newspaper or watching television: 1    Moving or speaking so slowly that other people could have noticed. Or the opposite - being so fidgety or restless that you have been moving around a lot more than usual: 1    If you checked off any problems, how difficult have these problems made it for you to do your work, take care of things at home, or get along with other people?: Somewhat difficult         Advanced Directives:   He does have a Living Will but we do NOT have it on file in Epic.    He does have a POA but we do NOT have it on file in Epic.    Discussed Advance Care Planning with patient (and family/surrogate if present). Standard forms made available to patient in After Visit Summary.      Problem List[1]  Allergies:  He is allergic to caffeine, polymyxin b-trimethoprim, and meloxicam.    Current Medications:  Active Meds, Sig Only[2]    Medical History:  He  has a past medical history of Back pain.  Surgical History:  He  has a past surgical history that includes other surgical history () and hernia surgery (Right, 2017).   Family History:  His family history includes Hypertension in his father and mother; Stroke in his father.  Social History:  He  reports that he quit smoking about 25 years ago. His smoking use included cigarettes and cigars. He started smoking about 55 years ago. He has a 15 pack-year smoking history. He has never used smokeless tobacco. He reports that he does not drink alcohol and does not use drugs.    Tobacco:  He smoked tobacco in the past but quit greater than 12 months ago.  Tobacco Use[3]     CAGE Alcohol Screen:   CAGE screening score of 0 on 2025, showing low risk of alcohol abuse.      Patient Care Team:  Arnulfo Andrew MD as PCP - General (Family Practice)    Review of Systems   Constitutional:  Negative.  Negative for activity change, appetite change, chills and fever.   HENT: Negative.     Eyes: Negative.    Respiratory: Negative.  Negative for shortness of breath.    Cardiovascular: Negative.  Negative for chest pain and palpitations.   Gastrointestinal: Negative.  Negative for abdominal pain.   Genitourinary: Negative.  Negative for dysuria.   Musculoskeletal:  Negative for arthralgias.   Skin: Negative.  Negative for rash.   Allergic/Immunologic: Negative.    Neurological: Negative.        Objective:   Physical Exam  Vitals and nursing note reviewed.   Constitutional:       General: He is not in acute distress.     Appearance: Normal appearance. He is well-developed. He is obese.   HENT:      Head: Normocephalic and atraumatic.      Right Ear: Tympanic membrane and external ear normal.      Left Ear: Tympanic membrane and external ear normal.      Nose: Nose normal.      Mouth/Throat:      Mouth: Mucous membranes are moist.   Eyes:      Extraocular Movements: Extraocular movements intact.      Pupils: Pupils are equal, round, and reactive to light.   Cardiovascular:      Rate and Rhythm: Normal rate and regular rhythm.      Pulses: Normal pulses.           Carotid pulses are 2+ on the right side and 2+ on the left side.       Radial pulses are 2+ on the right side and 2+ on the left side.        Dorsalis pedis pulses are 2+ on the right side and 2+ on the left side.        Posterior tibial pulses are 2+ on the right side and 2+ on the left side.      Heart sounds: Normal heart sounds, S1 normal and S2 normal. No murmur heard.  Pulmonary:      Effort: Pulmonary effort is normal. No respiratory distress.      Breath sounds: Normal breath sounds.   Abdominal:      General: Abdomen is flat. Bowel sounds are normal. There is no distension.      Palpations: Abdomen is soft.   Musculoskeletal:         General: Normal range of motion.      Cervical back: Normal range of motion and neck supple.      Right lower  leg: No edema.      Left lower leg: No edema.   Skin:     General: Skin is warm and dry.      Capillary Refill: Capillary refill takes less than 2 seconds.      Findings: No rash.   Neurological:      General: No focal deficit present.      Mental Status: He is alert and oriented to person, place, and time.   Psychiatric:         Mood and Affect: Mood normal.         Behavior: Behavior normal.         Thought Content: Thought content normal.         Judgment: Judgment normal.       /68   Pulse 70   Resp 16   Ht 5' 11\" (1.803 m)   Wt (!) 328 lb 3.2 oz (148.9 kg)   BMI 45.77 kg/m²  Estimated body mass index is 45.77 kg/m² as calculated from the following:    Height as of this encounter: 5' 11\" (1.803 m).    Weight as of this encounter: 328 lb 3.2 oz (148.9 kg).    Medicare Hearing Assessment:   Hearing Screening    Screening Method: Whisper Test  Whisper Test Result: Pass         Visual Acuity:   Right Eye Visual Acuity: Uncorrected Right Eye Chart Acuity: 20/25   Left Eye Visual Acuity: Uncorrected Left Eye Chart Acuity: 20/25   Both Eyes Visual Acuity: Uncorrected Both Eyes Chart Acuity: 20/25   Able To Tolerate Visual Acuity: Yes        Assessment & Plan:   Iggy Kim is a 54 year old male who presents for a Medicare Assessment.       Assessment & Plan  Annual physical exam         Chronic brain injury  Seen in 2014. Stable with [facility] follow up overall.         Mild recurrent major depression  Clinical Course: stable   Good control    Antidepressant Meds: sertraline Tabs - 100 MG  Anxiolytic Meds: busPIRone Tabs - 10 MG          Morbid obesity with BMI of 40.0-44.9, adult ([facility])  Last BMI: 45.77, Class 3 Obesity.  He was counseled on diet and exercise for elevated BMI which is affecting his hypertension and osteoarthritis.          Essential hypertension  BP shows good control with last BP of 128/68. Continue lifestyle changes, diet, exercise and weight loss.   No K, Cr or eGFR on file.  BP Meds:  amLODIPine Tabs - 5 MG; furosemide Tabs - 40 MG; hydroCHLOROthiazide Tabs - 25 MG; lisinopril Tabs - 20 MG; metoprolol tartrate Tabs - 25 MG   Aoff lisinopril continue to monitor and continue present management            Partial seizures (HCC)  Chronic  seizures, partial seizures without status epilepticus. control: stable.  No seizure lab levels in last 5 years.  carBAMazepine Tabs - 200 MG; divalproex ER Tb24 - 500 MG; gabapentin Caps - 300 MGMedication compliance : Good  Currently stable and controlled with the current treatment plan. Continue present management.        Cavernous hemangioma of brain (HCC)  Sees neurosurgery at [facility]. Notes in care everywhere reviewed inclidung 110.2024 notes.          Opioid dependence with current use (HCC)  Stil on Norco 4 per day, doing ok but issues wit timeing have been an issue. Discussed options.          Chronic pain syndrome  Stable with 120 noroco per month         Degeneration of intervertebral disc of lumbosacral region with discogenic back pain  Source of back pain. Continue to monitor and continue present management          Elevated LFTs  Stable LFTs            Assessment & Plan  Chronic lower back pain  Persistent lower left back pain with no identified etiology.    Knee osteoarthritis  Bilateral knee pain, right more severe. Imaging shows mild to moderate degenerative changes in left knee. Discussed future knee replacement if condition worsens.    Headache with previous surgery for blood clots  Recurrent left-sided headache similar to pre-surgery symptoms. Reviewed past imaging; no new imaging ordered.  - Consider baby aspirin for headache management.    Weight loss management  Weight reduced from 384 lbs to 328 lbs with Zepbound. Excess skin noted. Discussed potential panniculectomy if skin does not tighten.    Chronic pain management  On hydrocodone with issues in [facility]'s 28-day cycle causing medication gaps. Discussed acetaminophen and NSAIDs for pain  management. Explained acetaminophen up to 3000 mg/day and NSAIDs in moderation.  - Discontinue prednisone as completed.  The patient indicates understanding of these issues and agrees to the plan.  Reinforced healthy diet, lifestyle, and exercise.      Return in about 1 year (around 4/9/2026) for AHA (Annual Health Assessment) visit (Supervisit), recheck.     Arnulfo Andrew MD, 4/9/2025     Supplementary Documentation:   General Health:  In the past six months, have you lost more than 10 pounds without trying?: 1 - Yes  Has your appetite been poor?: No  Type of Diet: Balanced  How does the patient maintain a good energy level?: Daily Walks  How would you describe your daily physical activity?: Light  How would you describe your current health state?: Fair  How do you maintain positive mental well-being?: Games  On a scale of 0 to 10, with 0 being no pain and 10 being severe pain, what is your pain level?: 7 - (Severe)  In the past six months, have you experienced urine leakage?: 0-No  At any time do you feel concerned for the safety/well-being of yourself and/or your children, in your home or elsewhere?: No  Have you had any immunizations at another office such as Influenza, Hepatitis B, Tetanus, or Pneumococcal?: No    Health Maintenance   Topic Date Due    DTaP,Tdap,and Td Vaccines (1 - Tdap) Never done    PSA  Never done    Colorectal Cancer Screening  01/01/2022    Zoster Vaccines (2 of 2) 06/23/2023    Annual Well Visit  01/01/2025    Annual Depression Screening  01/01/2025    Influenza Vaccine  Completed    Pneumococcal Vaccine: 50+ Years  Completed    COVID-19 Vaccine  Completed    Meningococcal B Vaccine  Aged Out            [1]   Patient Active Problem List  Diagnosis    Cavernous hemangioma of brain (HCC)    Cigarette nicotine dependence in remission    Chronic pain syndrome    RLS (restless legs syndrome)    Chronic brain injury    DDD (degenerative disc disease), lumbosacral    Morbid obesity with BMI of  40.0-44.9, adult (HCC)    Arthritis of facet joint of lumbar spine    Chronic pain of both knees    Chronic pain of both shoulders    Essential hypertension    Opioid dependence with current use (HCC)    Elevated LFTs    Mild recurrent major depression    Unspecified inflammatory spondylopathy, lumbar region    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    carBAMazepine (TEGRETOL) 200 MG Oral Tab Take 1 tablet (200 mg total) by mouth.    gabapentin 300 MG Oral Cap Take 1 capsule (300 mg total) by mouth.    Naloxone HCl 4 MG/0.1ML Nasal Liquid by Nasal route.    Tirzepatide-Weight Management (ZEPBOUND) 7.5 MG/0.5ML Subcutaneous Solution Auto-injector Inject into the skin.    oxyCODONE 10 MG Oral Tab Take 1 tablet (10 mg total) by mouth every 4 (four) hours as needed for Pain (severe pain related to fall on ice).    HYDROcodone-acetaminophen 5-325 MG Oral Tab Take 1-2 tablets by mouth every 4 (four) hours as needed for Pain.    naproxen 500 MG Oral Tab     amoxicillin clavulanate 875-125 MG Oral Tab     furosemide 40 MG Oral Tab     divalproex Sodium  MG Oral Tablet 24 Hr     ACNE MEDICATION 5 5 % External Lotion     hydrocortisone 2.5 % External Ointment     ketoconazole 2 % External Cream     omeprazole 20 MG Oral Capsule Delayed Release     sertraline 100 MG Oral Tab     Sulfacetamide Sodium, Acne, 10 % External Lotion     tiZANidine 4 MG Oral Tab     triamcinolone acetonide 0.1 % External Cream     Diclofenac Sodium 1 % External Gel     VITAMIN D3 125 MCG (5000 UT) Oral Cap     busPIRone 10 MG Oral Tab     Clindamycin Phosphate 1 % External Lotion Apply 1 Application topically 2 (two) times daily.    atorvastatin 80 MG Oral Tab Take 1 tablet (80 mg total) by mouth nightly.     Albuterol Sulfate  (90 Base) MCG/ACT Inhalation Aero Soln Inhale 2 puffs into the lungs every 6 (six) hours as needed for Wheezing.    lisinopril 20 MG Oral Tab Take 1 tablet (20 mg total) by mouth daily.    hydrochlorothiazide 25 MG Oral Tab Take 1 tablet (25 mg total) by mouth daily.    metoprolol Tartrate 25 MG Oral Tab Take 1 tablet (25 mg total) by mouth 2 (two) times daily.    AmLODIPine Besylate 5 MG Oral Tab Take 1 tablet (5 mg total) by mouth daily.    Polyethylene Glycol 3350 (MIRALAX) Oral Powd Pack Take 17 g by mouth daily.    Psyllium (METAMUCIL FIBER OR) Take 1 Applicatorful by mouth daily.    rOPINIRole HCl (REQUIP) 1 MG Oral Tab Take 1 tablet by mouth nightly. At Bedtime    Naproxen Sodium (ALEVE) 220 MG Oral Tab Take 1-2 tablets (220-440 mg total) by mouth Q12H.    Multiple Vitamin (MULTI VITAMIN MENS) Oral Tab Take 1 tablet by mouth daily.    omega-3 fatty acids (FISH OIL) 1000 MG Oral Cap Take 1,000 mg by mouth daily.    Misc Natural Products (OSTEO BI-FLEX ADV DOUBLE ST OR) Take  by mouth.   [3]   Social History  Tobacco Use   Smoking Status Former    Current packs/day: 0.00    Average packs/day: 0.5 packs/day for 30.0 years (15.0 ttl pk-yrs)    Types: Cigarettes, Cigars    Start date: 1970    Quit date: 2000    Years since quittin.2   Smokeless Tobacco Never

## 2025-04-09 NOTE — ASSESSMENT & PLAN NOTE
Chronic  seizures, partial seizures without status epilepticus. control: stable.  No seizure lab levels in last 5 years.  carBAMazepine Tabs - 200 MG; divalproex ER Tb24 - 500 MG; gabapentin Caps - 300 MGMedication compliance : Good  Currently stable and controlled with the current treatment plan. Continue present management.

## 2025-04-10 NOTE — ASSESSMENT & PLAN NOTE
Sees neurosurgery at [facility]. Notes in care everywhere reviewed inclidung 110.2024 notes.

## 2025-04-10 NOTE — ASSESSMENT & PLAN NOTE
Stil on Norco 4 per day, doing ok but issues wit timeing have been an issue. Discussed options.

## (undated) NOTE — Clinical Note
Initial assessment completed with patient.  Appointment scheduled for an ER follow up with you on 02/17. [facility]. No further outreach needed at this time. Thank you!